# Patient Record
Sex: FEMALE | Race: BLACK OR AFRICAN AMERICAN | Employment: FULL TIME | URBAN - METROPOLITAN AREA
[De-identification: names, ages, dates, MRNs, and addresses within clinical notes are randomized per-mention and may not be internally consistent; named-entity substitution may affect disease eponyms.]

---

## 2017-09-29 ENCOUNTER — HOSPITAL ENCOUNTER (EMERGENCY)
Age: 37
Discharge: HOME | End: 2017-09-29
Attending: FAMILY MEDICINE

## 2017-09-29 VITALS
WEIGHT: 176 LBS | OXYGEN SATURATION: 99 % | BODY MASS INDEX: 28.28 KG/M2 | HEART RATE: 77 BPM | TEMPERATURE: 98.9 F | SYSTOLIC BLOOD PRESSURE: 131 MMHG | RESPIRATION RATE: 18 BRPM | HEIGHT: 66 IN | DIASTOLIC BLOOD PRESSURE: 82 MMHG

## 2017-09-29 DIAGNOSIS — R07.89 CHEST WALL PAIN: Primary | ICD-10-CM

## 2017-09-29 RX ORDER — CYCLOBENZAPRINE HCL 5 MG
5 TABLET ORAL
Qty: 20 TAB | Refills: 0 | Status: SHIPPED | OUTPATIENT
Start: 2017-09-29 | End: 2017-12-01 | Stop reason: ALTCHOICE

## 2017-09-29 RX ORDER — NAPROXEN 500 MG/1
500 TABLET ORAL
Qty: 30 TAB | Refills: 0 | Status: SHIPPED | OUTPATIENT
Start: 2017-09-29 | End: 2017-12-01 | Stop reason: ALTCHOICE

## 2017-09-29 NOTE — DISCHARGE INSTRUCTIONS
Musculoskeletal Chest Pain: Care Instructions  Your Care Instructions  Chest pain is not always a sign that something is wrong with your heart or that you have another serious problem. The doctor thinks your chest pain is caused by strained muscles or ligaments, inflamed chest cartilage, or another problem in your chest, rather than by your heart. You may need more tests to find the cause of your chest pain. Follow-up care is a key part of your treatment and safety. Be sure to make and go to all appointments, and call your doctor if you are having problems. Its also a good idea to know your test results and keep a list of the medicines you take. How can you care for yourself at home? · Take pain medicines exactly as directed. ¨ If the doctor gave you a prescription medicine for pain, take it as prescribed. ¨ If you are not taking a prescription pain medicine, ask your doctor if you can take an over-the-counter medicine. · Rest and protect the sore area. · Stop, change, or take a break from any activity that may be causing your pain or soreness. · Put ice or a cold pack on the sore area for 10 to 20 minutes at a time. Try to do this every 1 to 2 hours for the next 3 days (when you are awake) or until the swelling goes down. Put a thin cloth between the ice and your skin. · After 2 or 3 days, apply a heating pad set on low or a warm cloth to the area that hurts. Some doctors suggest that you go back and forth between hot and cold. · Do not wrap or tape your ribs for support. This may cause you to take smaller breaths, which could increase your risk of lung problems. · Mentholated creams such as Bengay or Icy Hot may soothe sore muscles. Follow the instructions on the package. · Follow your doctor's instructions for exercising. · Gentle stretching and massage may help you get better faster. Stretch slowly to the point just before pain begins, and hold the stretch for at least 15 to 30 seconds.  Do this 3 or 4 times a day. Stretch just after you have applied heat. · As your pain gets better, slowly return to your normal activities. Any increased pain may be a sign that you need to rest a while longer. When should you call for help? Call 911 anytime you think you may need emergency care. For example, call if:  · You have chest pain or pressure. This may occur with:  ¨ Sweating. ¨ Shortness of breath. ¨ Nausea or vomiting. ¨ Pain that spreads from the chest to the neck, jaw, or one or both shoulders or arms. ¨ Dizziness or lightheadedness. ¨ A fast or uneven pulse. After calling 911, chew 1 adult-strength aspirin. Wait for an ambulance. Do not try to drive yourself. · You have sudden chest pain and shortness of breath, or you cough up blood. Call your doctor now or seek immediate medical care if:  · You have any trouble breathing. · Your chest pain gets worse. · Your chest pain occurs consistently with exercise and is relieved by rest.  Watch closely for changes in your health, and be sure to contact your doctor if:  · Your chest pain does not get better after 1 week. Where can you learn more? Go to http://karley-hakan.info/. Enter V293 in the search box to learn more about \"Musculoskeletal Chest Pain: Care Instructions. \"  Current as of: March 20, 2017  Content Version: 11.3  © 7570-1932 [employer]. Care instructions adapted under license by [employer] (which disclaims liability or warranty for this information). If you have questions about a medical condition or this instruction, always ask your healthcare professional. [place] [address] any warranty or liability for your use of this information.

## 2017-09-29 NOTE — UC PROVIDER NOTE
HPI Comments: Jaci Zazueta presents with left sided chest wall pain with movement that started this AM, has been constant. No improvement with Advil. Denies SOB, palpitations, dizziness, N/V. The history is provided by the patient. History reviewed. No pertinent past medical history. Past Surgical History:   Procedure Laterality Date    HX GYN       c sections         Family History   Problem Relation Age of Onset    Heart Disease Neg Hx         Social History     Social History    Marital status:      Spouse name: N/A    Number of children: N/A    Years of education: N/A     Occupational History    Not on file. Social History Main Topics    Smoking status: Never Smoker    Smokeless tobacco: Never Used    Alcohol use No    Drug use: No    Sexual activity: Yes     Partners: Male     Other Topics Concern    Not on file     Social History Narrative                ALLERGIES: Review of patient's allergies indicates no known allergies. Review of Systems   Constitutional: Negative for chills and fever. Respiratory: Negative for shortness of breath and wheezing. Cardiovascular: Positive for chest pain. Negative for palpitations. Gastrointestinal: Negative for nausea and vomiting. Skin: Negative for rash. Neurological: Negative for dizziness and headaches. Vitals:    09/29/17 1443   BP: 131/82   Pulse: 77   Resp: 18   Temp: 98.9 °F (37.2 °C)   SpO2: 99%   Weight: 79.8 kg (176 lb)   Height: 5' 6\" (1.676 m)       Physical Exam   Constitutional: She appears well-developed and well-nourished. No distress. Cardiovascular: Normal rate, regular rhythm and normal heart sounds. Pulmonary/Chest: Effort normal and breath sounds normal. No respiratory distress. She has no wheezes. She has no rales. She exhibits no tenderness. Neurological: She is alert. Skin: She is not diaphoretic. Psychiatric: She has a normal mood and affect.  Her behavior is normal. Judgment and thought content normal.   Nursing note and vitals reviewed. MDM     Differential Diagnosis; Clinical Impression; Plan:     CLINICAL IMPRESSION:  Chest wall pain  (primary encounter diagnosis)    Plan:  1. Naproxen, flexeril prn  2. heat  3. ER if worse or experiencing SOB, dizziness  Risk of Significant Complications, Morbidity, and/or Mortality:   Presenting problems: Moderate  Diagnostic procedures: Moderate  Management options:   Moderate  Progress:   Patient progress:  Stable      EKG  Date/Time: 9/29/2017 2:50 PM  Performed by: Meera Caceres  Authorized by: Abimbola ROSADO     Interpretation:     Interpretation: normal    Rate:     ECG rate:  71    ECG rate assessment: normal    Rhythm:     Rhythm: sinus rhythm    Ectopy:     Ectopy: none    QRS:     QRS axis:  Normal    QRS intervals:  Normal  Conduction:     Conduction: normal    ST segments:     ST segments:  Normal  T waves:     T waves: normal

## 2017-09-30 LAB
ATRIAL RATE: 71 BPM
CALCULATED P AXIS, ECG09: 57 DEGREES
CALCULATED R AXIS, ECG10: 31 DEGREES
CALCULATED T AXIS, ECG11: 50 DEGREES
DIAGNOSIS, 93000: NORMAL
P-R INTERVAL, ECG05: 182 MS
Q-T INTERVAL, ECG07: 372 MS
QRS DURATION, ECG06: 86 MS
QTC CALCULATION (BEZET), ECG08: 404 MS
VENTRICULAR RATE, ECG03: 71 BPM

## 2017-12-01 ENCOUNTER — OFFICE VISIT (OUTPATIENT)
Dept: INTERNAL MEDICINE CLINIC | Age: 37
End: 2017-12-01

## 2017-12-01 VITALS
TEMPERATURE: 98.7 F | HEART RATE: 76 BPM | WEIGHT: 176.4 LBS | OXYGEN SATURATION: 93 % | DIASTOLIC BLOOD PRESSURE: 72 MMHG | RESPIRATION RATE: 16 BRPM | SYSTOLIC BLOOD PRESSURE: 120 MMHG | HEIGHT: 66 IN | BODY MASS INDEX: 28.35 KG/M2

## 2017-12-01 DIAGNOSIS — Z76.89 ENCOUNTER TO ESTABLISH CARE: ICD-10-CM

## 2017-12-01 DIAGNOSIS — B35.0 TINEA CAPITIS: Primary | ICD-10-CM

## 2017-12-01 DIAGNOSIS — L65.9 HAIR LOSS: ICD-10-CM

## 2017-12-01 RX ORDER — TERBINAFINE HYDROCHLORIDE 250 MG/1
250 TABLET ORAL DAILY
Qty: 45 TAB | Refills: 0 | Status: SHIPPED | OUTPATIENT
Start: 2017-12-01 | End: 2021-01-14 | Stop reason: ALTCHOICE

## 2017-12-01 NOTE — PROGRESS NOTES
Chief Complaint   Patient presents with    New Patient     want to establish pcp she is having some hair loss and daughter was diagnosed with tinea capitis  unsure why she is having problem

## 2017-12-05 LAB
ALBUMIN SERPL-MCNC: 4.2 G/DL (ref 3.5–5.5)
ALBUMIN/GLOB SERPL: 1.7 {RATIO} (ref 1.2–2.2)
ALP SERPL-CCNC: 61 IU/L (ref 39–117)
ALT SERPL-CCNC: 11 IU/L (ref 0–32)
AST SERPL-CCNC: 12 IU/L (ref 0–40)
BASOPHILS # BLD AUTO: 0 X10E3/UL (ref 0–0.2)
BASOPHILS NFR BLD AUTO: 1 %
BILIRUB SERPL-MCNC: 0.3 MG/DL (ref 0–1.2)
BUN SERPL-MCNC: 9 MG/DL (ref 6–20)
BUN/CREAT SERPL: 16 (ref 9–23)
CALCIUM SERPL-MCNC: 9.1 MG/DL (ref 8.7–10.2)
CHLORIDE SERPL-SCNC: 103 MMOL/L (ref 96–106)
CO2 SERPL-SCNC: 25 MMOL/L (ref 18–29)
CREAT SERPL-MCNC: 0.56 MG/DL (ref 0.57–1)
EOSINOPHIL # BLD AUTO: 0 X10E3/UL (ref 0–0.4)
EOSINOPHIL NFR BLD AUTO: 1 %
ERYTHROCYTE [DISTWIDTH] IN BLOOD BY AUTOMATED COUNT: 13.4 % (ref 12.3–15.4)
FOLATE SERPL-MCNC: 14.6 NG/ML
GFR SERPLBLD CREATININE-BSD FMLA CKD-EPI: 119 ML/MIN/1.73
GFR SERPLBLD CREATININE-BSD FMLA CKD-EPI: 138 ML/MIN/1.73
GLOBULIN SER CALC-MCNC: 2.5 G/DL (ref 1.5–4.5)
GLUCOSE SERPL-MCNC: 100 MG/DL (ref 65–99)
HCT VFR BLD AUTO: 36.2 % (ref 34–46.6)
HGB BLD-MCNC: 12.1 G/DL (ref 11.1–15.9)
IMM GRANULOCYTES # BLD: 0 X10E3/UL (ref 0–0.1)
IMM GRANULOCYTES NFR BLD: 0 %
LYMPHOCYTES # BLD AUTO: 1.7 X10E3/UL (ref 0.7–3.1)
LYMPHOCYTES NFR BLD AUTO: 46 %
MCH RBC QN AUTO: 30.1 PG (ref 26.6–33)
MCHC RBC AUTO-ENTMCNC: 33.4 G/DL (ref 31.5–35.7)
MCV RBC AUTO: 90 FL (ref 79–97)
MONOCYTES # BLD AUTO: 0.2 X10E3/UL (ref 0.1–0.9)
MONOCYTES NFR BLD AUTO: 7 %
NEUTROPHILS # BLD AUTO: 1.6 X10E3/UL (ref 1.4–7)
NEUTROPHILS NFR BLD AUTO: 45 %
PLATELET # BLD AUTO: 193 X10E3/UL (ref 150–379)
POTASSIUM SERPL-SCNC: 4.1 MMOL/L (ref 3.5–5.2)
PROT SERPL-MCNC: 6.7 G/DL (ref 6–8.5)
RBC # BLD AUTO: 4.02 X10E6/UL (ref 3.77–5.28)
SODIUM SERPL-SCNC: 140 MMOL/L (ref 134–144)
VIT B12 SERPL-MCNC: 423 PG/ML (ref 232–1245)
WBC # BLD AUTO: 3.6 X10E3/UL (ref 3.4–10.8)

## 2017-12-09 NOTE — PROGRESS NOTES
Results reviewed. CMP wnl with exception of glucose 100 and creatinine 0.56. CBC with diff and B12 /folate within range. Recommend eating healthy, decreasing concentrated sweets and stay active. Notified by [address] [place] [address] [address].

## 2017-12-21 NOTE — PROGRESS NOTES
This note will not be viewable in [address] Luis Fernando Owens is a 40y.o. year old female who is a new patient to me today. She presents to establish care and concern about hair loss. Luis Fernando Owens is a  40 y.o. female presents for visit. Chief Complaint   Patient presents with    New Patient     want to establish pcp she is having some hair loss and daughter was diagnosed with tinea capitis  unsure why she is having problem       HPI  Patient reports her daughter was diagnosed with tinea capitus and states she is concerned that she may have this too. Patient was previously evaluated by a dermatologist for this condition but KOH testing was not preformed. States she had some labs done there and were normal per patient. Record not available for review. Denies changing shampoo, soap. Generally healthy, no additional concerns. Review of Systems   Constitutional: Negative for chills, fever, malaise/fatigue and weight loss. Respiratory: Negative for cough and shortness of breath. Cardiovascular: Negative for chest pain and palpitations. Gastrointestinal: Negative for abdominal pain, constipation, diarrhea, heartburn, nausea and vomiting. Musculoskeletal: Negative for joint pain and myalgias. Neurological: Negative for headaches. Endo/Heme/Allergies: Negative for environmental allergies. Psychiatric/Behavioral: Negative for depression. The patient does not have insomnia. History reviewed. No pertinent past medical history. Past Surgical History:   Procedure Laterality Date    HX GYN       c sections        Social History   Substance Use Topics    Smoking status: Never Smoker    Smokeless tobacco: Never Used    Alcohol use Yes      Social History     Social History Narrative     Family History   Problem Relation Age of Onset    Thyroid Disease Maternal Grandmother     Heart Disease Neg Hx       Prior to Admission medications    Medication Sig Start Date End Date Taking? Authorizing Provider   selenium sulfide (SELSUN BLUE) 1 % shampoo Apply 10 mL to affected area as needed for Dandruff. 12/1/17  Yes Archie Eye, NP   terbinafine HCl (LAMISIL) 250 mg tablet Take 1 Tab by mouth daily. Indications: TINEA CAPITIS 12/1/17  Yes Archie Eye, NP   levonorgestrel (MIRENA) 20 mcg/24 hr IUD 1 Each by IntraUTERine route once. Yes Historical Provider      No Known Allergies       Visit Vitals    /72 (BP 1 Location: Left arm, BP Patient Position: Sitting)    Pulse 76    Temp 98.7 °F (37.1 °C) (Oral)    Resp 16    Ht 5' 6\" (1.676 m)    Wt 176 lb 6.4 oz (80 kg)    LMP 11/08/2017  Comment: iud    SpO2 93%    BMI 28.47 kg/m2     Physical Exam   Constitutional: She is oriented to person, place, and time. No distress. HENT:   Head: Normocephalic and atraumatic. Right Ear: External ear normal.   Left Ear: External ear normal.   Eyes: Conjunctivae are normal.   Cardiovascular: Normal rate, regular rhythm and normal heart sounds. Pulmonary/Chest: Effort normal and breath sounds normal. She has no wheezes. Musculoskeletal: She exhibits no edema. Neurological: She is alert and oriented to person, place, and time. Skin: Skin is warm and dry. No abrasion noted. Rash is not papular and not pustular. No cyanosis or erythema. Nails show no clubbing. Scaly patches with minimal subtle alopecia noted to scalp   Psychiatric: She has a normal mood and affect. Nursing note and vitals reviewed. No results found for this or any previous visit (from the past 24 hour(s)). ASSESSMENT AND PLAN:  There are no active problems to display for this patient. ICD-10-CM ICD-9-CM   1. Tinea capitis B35.0 110.0   2. Hair loss L65.9 704.00   3. BMI 28.0-28.9,adult Z68.28 V85.24   4.  Encounter to establish care Z76.89 V65.8     Orders Placed This Encounter    METABOLIC PANEL, COMPREHENSIVE    CBC WITH AUTOMATED DIFF    VITAMIN B12 & FOLATE    selenium sulfide (SELSUN BLUE) 1 % shampoo     Sig: Apply 10 mL to affected area as needed for Dandruff. Dispense:  1 Bottle     Refill:  0    terbinafine HCl (LAMISIL) 250 mg tablet     Sig: Take 1 Tab by mouth daily. Indications: TINEA CAPITIS     Dispense:  45 Tab     Refill:  0       Diagnoses and all orders for this visit:    1. Tinea capitis  -     selenium sulfide (SELSUN BLUE) 1 % shampoo; Apply 10 mL to affected area as needed for Dandruff. -     terbinafine HCl (LAMISIL) 250 mg tablet; Take 1 Tab by mouth daily. Indications: TINEA CAPITIS  -     METABOLIC PANEL, COMPREHENSIVE  -     CBC WITH AUTOMATED DIFF    2. Hair loss  -     METABOLIC PANEL, COMPREHENSIVE  -     CBC WITH AUTOMATED DIFF  -     VITAMIN B12 & FOLATE    3. BMI 28.0-28.9,adult    4. Encounter to establish care        lab results and schedule of future lab studies reviewed with patient  reviewed diet, exercise and weight control  continue current treatment pending lab results        Follow-up Disposition:  Return in about 4 weeks (around 12/29/2017), or if symptoms worsen or fail to improve, for FULL Phyisal Exam and f/u. Disclaimer:  Advised her to call back or return to office if symptoms worsen/change/persist.  Discussed expected course/resolution/complications of diagnosis in detail with patient. Medication risks/benefits/costs/interactions/alternatives discussed with patient. She was given an after visit summary which includes diagnoses, current medications, & vitals. She expressed understanding with the diagnosis and plan.

## 2018-02-14 ENCOUNTER — HOSPITAL ENCOUNTER (OUTPATIENT)
Dept: LAB | Age: 38
Discharge: HOME | End: 2018-02-14

## 2021-01-14 ENCOUNTER — OFFICE VISIT (OUTPATIENT)
Dept: FAMILY MEDICINE CLINIC | Age: 41
End: 2021-01-14
Payer: COMMERCIAL

## 2021-01-14 VITALS
OXYGEN SATURATION: 98 % | WEIGHT: 192.8 LBS | SYSTOLIC BLOOD PRESSURE: 119 MMHG | TEMPERATURE: 97.8 F | HEIGHT: 66 IN | BODY MASS INDEX: 30.98 KG/M2 | HEART RATE: 77 BPM | RESPIRATION RATE: 18 BRPM | DIASTOLIC BLOOD PRESSURE: 80 MMHG

## 2021-01-14 DIAGNOSIS — Z13.220 SCREENING FOR LIPID DISORDERS: ICD-10-CM

## 2021-01-14 DIAGNOSIS — Z00.00 ROUTINE GENERAL MEDICAL EXAMINATION AT HEALTH CARE FACILITY: Primary | ICD-10-CM

## 2021-01-14 DIAGNOSIS — Z13.1 SCREENING FOR DIABETES MELLITUS: ICD-10-CM

## 2021-01-14 DIAGNOSIS — E55.9 VITAMIN D DEFICIENCY: ICD-10-CM

## 2021-01-14 PROCEDURE — 99386 PREV VISIT NEW AGE 40-64: CPT | Performed by: FAMILY MEDICINE

## 2021-01-14 NOTE — PROGRESS NOTES
Chief Complaint   Patient presents with   [facility].     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 Have you been to the ER, urgent care clinic since your last visit? Hospitalized since your last visit? No    2. Have you seen or consulted any other health care providers outside of the [address] [place] [address] since your last visit? Include any pap smears or colon screening. No    3 most recent PHQ Screens 1/14/2021   Little interest or pleasure in doing things Not at all   Feeling down, depressed, irritable, or hopeless Not at all   Total Score PHQ 2 0       Health Maintenance Due   Topic Date Due    PAP AKA CERVICAL CYTOLOGY  10/03/2001    Flu Vaccine (1) 09/01/2020    Lipid Screen  10/03/2020     Pt. Had a pap smear and a Mammogram on November 2020    Patient declined flu vaccine today but will schedule an appointment later.

## 2021-01-14 NOTE — PROGRESS NOTES
Patient Name: Monie Valentin   MRN: [id number]    Anita Roa is a 36 y.o. female who presents with the following: Here to establish care with new PCP. Cervical Cancer Screening: UTD, done by OBGYN, records requested. Breast Cancer Screening: UTD - done by OBGYN, records requested    CAD risk factors:  HTN: wnl  BP Readings from Last 3 Encounters:   01/14/21 119/80   12/01/17 120/72   09/29/17 131/82     Lipid: due  DM: due    Hx of vitamin D deficiency, not on supplements. Review of Systems   Constitutional: Negative for chills, fever, malaise/fatigue and weight loss. HENT: Negative for hearing loss, nosebleeds and sore throat. Respiratory: Negative for cough, sputum production, shortness of breath and wheezing. Cardiovascular: Negative for chest pain, palpitations, leg swelling and PND. Gastrointestinal: Negative for abdominal pain, blood in stool, constipation, diarrhea, nausea and vomiting. Genitourinary: Negative for dysuria, frequency and urgency. Musculoskeletal: Negative for back pain, falls, joint pain, myalgias and neck pain. Skin: Negative for itching and rash. Neurological: Negative for dizziness, sensory change, focal weakness and loss of consciousness. Psychiatric/Behavioral: Negative for depression. The patient is not nervous/anxious. All other systems reviewed and are negative. The patient's medications, allergies, past medical history, surgical history, family history and social history were reviewed and updated where appropriate. Prior to Admission medications    Medication Sig Start Date End Date Taking? Authorizing Provider   selenium sulfide (SELSUN BLUE) 1 % shampoo Apply 10 mL to affected area as needed for Dandruff. 12/1/17   Sommer Miller, NP   terbinafine HCl (LAMISIL) 250 mg tablet Take 1 Tab by mouth daily.  Indications: TINEA CAPITIS 12/1/17   Sommer Miller NP   levonorgestrel (MIRENA) 20 mcg/24 hr IUD 1 Each by IntraUTERine route once.       Provider, Historical       No Known Allergies      Past Medical History:   Diagnosis Date    Arthritis     Vitamin D deficiency 1/14/2021       Past Surgical History:   Procedure Laterality Date    HX GYN       c sections    HX JUAN AND BSO         Family History   Problem Relation Age of Onset    Thyroid Disease Maternal Grandmother     Prostate Cancer Maternal Grandfather     Breast Cancer Maternal Aunt     Heart Disease Neg Hx        Social History     Socioeconomic History    Marital status:      Spouse name: Not on file    Number of children: Not on file    Years of education: Not on file    Highest education level: Not on file   Occupational History    Not on file   Social Needs    Financial resource strain: Not on file    Food insecurity     Worry: Not on file     Inability: Not on file    Transportation needs     Medical: Not on file     Non-medical: Not on file   Tobacco Use    Smoking status: Never Smoker    Smokeless tobacco: Never Used   Substance and Sexual Activity    Alcohol use: Yes     Frequency: 2-3 times a week     Drinks per session: 1 or 2    Drug use: No    Sexual activity: Yes     Partners: Male   Lifestyle    Physical activity     Days per week: Not on file     Minutes per session: Not on file    Stress: Not on file   Relationships    Social connections     Talks on phone: Not on file     Gets together: Not on file     Attends Confucianism service: Not on file     Active member of club or organization: Not on file     Attends meetings of clubs or organizations: Not on file     Relationship status: Not on file    Intimate partner violence     Fear of current or ex partner: Not on file     Emotionally abused: Not on file     Physically abused: Not on file     Forced sexual activity: Not on file   Other Topics Concern    Not on file   Social History Narrative    Not on file         OBJECTIVE    Visit Vitals  /80 (BP 1 Location: Left arm, BP Patient Position: Sitting)   Pulse 77   Temp 97.8 °F (36.6 °C)   Resp 18   Ht 5' 6\" (1.676 m)   Wt 192 lb 12.8 oz (87.5 kg)   LMP 11/08/2017 Comment: iud   SpO2 98%   BMI 31.12 kg/m²       Physical Exam  Constitutional:       General: She is not in acute distress. Appearance: She is not diaphoretic. HENT:      Head: Normocephalic. Right Ear: External ear normal.      Left Ear: External ear normal.   Eyes:      Conjunctiva/sclera: Conjunctivae normal.      Pupils: Pupils are equal, round, and reactive to light. Cardiovascular:      Rate and Rhythm: Normal rate and regular rhythm. Heart sounds: Normal heart sounds. No murmur. No friction rub. No gallop. Pulmonary:      Effort: Pulmonary effort is normal. No respiratory distress. Breath sounds: Normal breath sounds. No wheezing or rales. Abdominal:      General: Bowel sounds are normal. There is no distension. Palpations: Abdomen is soft. Tenderness: There is no abdominal tenderness. There is no guarding or rebound. Skin:     General: Skin is warm and dry. Neurological:      Mental Status: She is alert and oriented to person, place, and time. Psychiatric:         Mood and Affect: Affect normal.         Cognition and Memory: Memory normal.         Judgment: Judgment normal.           ASSESSMENT AND PLAN  Miriam Marroquin is a 36 y.o. female who presents today for:    1. Routine general medical examination at health care facility  Reviewed age appropriate screening tests as recommended by the [place] Preventive Services Database with patient today. 2. Screening for diabetes mellitus  - HEMOGLOBIN A1C WITH EAG; Future  - HEMOGLOBIN A1C WITH EAG    3. Screening for lipid disorders  - CBC W/O DIFF; Future  - METABOLIC PANEL, COMPREHENSIVE; Future  - LIPID PANEL; Future  - LIPID PANEL  - METABOLIC PANEL, COMPREHENSIVE  - CBC W/O DIFF    4.  Vitamin D deficiency  - VITAMIN D, 25 HYDROXY; Future  - VITAMIN D, 25 HYDROXY       Medications Discontinued During This Encounter   Medication Reason    levonorgestrel (MIRENA) 20 mcg/24 hr IUD Therapy Completed    selenium sulfide (SELSUN BLUE) 1 % shampoo Therapy Completed    terbinafine HCl (LAMISIL) 250 mg tablet Therapy Completed           Treatment risks/benefits/costs/interactions/alternatives discussed with patient. Advised patient to call back or return to office if symptoms worsen/change/persist. If patient cannot reach us or should anything more severe/urgent arise he/she should proceed directly to the nearest emergency department. Discussed expected course/resolution/complications of diagnosis in detail with patient. Patient expressed understanding with the diagnosis and plan. Joanna Schulz M.D.

## 2021-01-15 LAB
25(OH)D3 SERPL-MCNC: 19.6 NG/ML (ref 30–100)
ALBUMIN SERPL-MCNC: 4.2 G/DL (ref 3.5–5)
ALBUMIN/GLOB SERPL: 1.3 {RATIO} (ref 1.1–2.2)
ALP SERPL-CCNC: 68 U/L (ref 45–117)
ALT SERPL-CCNC: 22 U/L (ref 12–78)
ANION GAP SERPL CALC-SCNC: 6 MMOL/L (ref 5–15)
AST SERPL-CCNC: 14 U/L (ref 15–37)
BILIRUB SERPL-MCNC: 0.2 MG/DL (ref 0.2–1)
BUN SERPL-MCNC: 15 MG/DL (ref 6–20)
BUN/CREAT SERPL: 20 (ref 12–20)
CALCIUM SERPL-MCNC: 9.5 MG/DL (ref 8.5–10.1)
CHLORIDE SERPL-SCNC: 105 MMOL/L (ref 97–108)
CHOLEST SERPL-MCNC: 167 MG/DL
CO2 SERPL-SCNC: 27 MMOL/L (ref 21–32)
CREAT SERPL-MCNC: 0.74 MG/DL (ref 0.55–1.02)
ERYTHROCYTE [DISTWIDTH] IN BLOOD BY AUTOMATED COUNT: 11.9 % (ref 11.5–14.5)
EST. AVERAGE GLUCOSE BLD GHB EST-MCNC: 97 MG/DL
GLOBULIN SER CALC-MCNC: 3.3 G/DL (ref 2–4)
GLUCOSE SERPL-MCNC: 86 MG/DL (ref 65–100)
HBA1C MFR BLD: 5 % (ref 4–5.6)
HCT VFR BLD AUTO: 39.3 % (ref 35–47)
HDLC SERPL-MCNC: 70 MG/DL
HDLC SERPL: 2.4 {RATIO} (ref 0–5)
HGB BLD-MCNC: 12.8 G/DL (ref 11.5–16)
LDLC SERPL CALC-MCNC: 80.6 MG/DL (ref 0–100)
LIPID PROFILE,FLP: NORMAL
MCH RBC QN AUTO: 30.7 PG (ref 26–34)
MCHC RBC AUTO-ENTMCNC: 32.6 G/DL (ref 30–36.5)
MCV RBC AUTO: 94.2 FL (ref 80–99)
NRBC # BLD: 0 K/UL (ref 0–0.01)
NRBC BLD-RTO: 0 PER 100 WBC
PLATELET # BLD AUTO: 202 K/UL (ref 150–400)
PMV BLD AUTO: 11.5 FL (ref 8.9–12.9)
POTASSIUM SERPL-SCNC: 3.9 MMOL/L (ref 3.5–5.1)
PROT SERPL-MCNC: 7.5 G/DL (ref 6.4–8.2)
RBC # BLD AUTO: 4.17 M/UL (ref 3.8–5.2)
SODIUM SERPL-SCNC: 138 MMOL/L (ref 136–145)
TRIGL SERPL-MCNC: 82 MG/DL (ref ?–150)
VLDLC SERPL CALC-MCNC: 16.4 MG/DL
WBC # BLD AUTO: 4.1 K/UL (ref 3.6–11)

## 2021-01-15 RX ORDER — ASPIRIN 325 MG
TABLET, DELAYED RELEASE (ENTERIC COATED) ORAL
Qty: 8 CAP | Refills: 0 | Status: SHIPPED | OUTPATIENT
Start: 2021-01-15 | End: 2021-12-30

## 2021-01-15 NOTE — PROGRESS NOTES
Dear Ms. Laboy,    I wanted to follow up on your recent test results:    Vitamin D levels are low. I have sent in a prescription for high dose vitamin D3 at 50,000 units once a week for 8 weeks. After 8 weeks, switch to a lower dose of over-the-counter vitamin D3 2000 units every day.   Other labs are normal.

## 2021-08-20 ENCOUNTER — TRANSCRIBE ORDER (OUTPATIENT)
Dept: SCHEDULING | Age: 41
End: 2021-08-20

## 2021-08-20 DIAGNOSIS — Z80.0 FAMILY HISTORY OF COLON CANCER: Primary | ICD-10-CM

## 2021-08-20 DIAGNOSIS — R10.11 RUQ PAIN: ICD-10-CM

## 2022-01-24 ENCOUNTER — OFFICE VISIT (OUTPATIENT)
Dept: SURGERY | Age: 42
End: 2022-01-24
Payer: COMMERCIAL

## 2022-01-24 VITALS
SYSTOLIC BLOOD PRESSURE: 116 MMHG | DIASTOLIC BLOOD PRESSURE: 82 MMHG | BODY MASS INDEX: 29.73 KG/M2 | HEIGHT: 66 IN | HEART RATE: 71 BPM | WEIGHT: 185 LBS

## 2022-01-24 DIAGNOSIS — N63.10 BREAST MASS, RIGHT: Primary | ICD-10-CM

## 2022-01-24 PROCEDURE — 76642 ULTRASOUND BREAST LIMITED: CPT | Performed by: SURGERY

## 2022-01-24 PROCEDURE — 99203 OFFICE O/P NEW LOW 30 MIN: CPT | Performed by: SURGERY

## 2022-01-24 NOTE — PROGRESS NOTES
HISTORY OF PRESENT ILLNESS  Sal Paul is a 39 y.o. female. HPI  NEW patient consult referred by  Dr. Nikki Elizalde for RIGHT breast fibroepithelial lesion vs phyllodes tumor. Patient is able to feel a RIGHT breast lump. Painful only when pressed. Mammogram and biopsy done. LTR = 19.7%    1/4/22 - RIGHT breast biopsy - fibroepithelial lesion vs phyllodes tumor    Family History:  Maternal aunt, breast cancer, dx in her late 42's, survivor. Maternal grandmother, colon cancer, genetic testing negative  Maternal grandfather, prostate cancer. Sister has fibroadenomas. Mammogram/us. [id number] James Salomon, 12/1/21, BIRADS 4  1.4x1. 6x1.0cm oval solid mass to RIGHT breast @6:0 8 cmfn    Past Medical History:   Diagnosis Date    Arthritis     Vitamin D deficiency 1/14/2021     Past Surgical History:   Procedure Laterality Date    HX GYN       c sections    HX PELVIC LAPAROSCOPY      d/t endometriosis    HX JUAN AND BSO       Social History     Socioeconomic History    Marital status:      Spouse name: Not on file    Number of children: Not on file    Years of education: Not on file    Highest education level: Not on file   Occupational History    Not on file   Tobacco Use    Smoking status: Never Smoker    Smokeless tobacco: Never Used   Vaping Use    Vaping Use: Never used   Substance and Sexual Activity    Alcohol use: Yes     Comment: 3-5 a week    Drug use: No    Sexual activity: Yes     Partners: Male   Other Topics Concern    Not on file   Social History Narrative    Not on file     Social Determinants of Health     Financial Resource Strain:     Difficulty of Paying Living Expenses: Not on file   Food Insecurity:     Worried About Running Out of Food in the Last Year: Not on file    Helen of Food in the Last Year: Not on file   Transportation Needs:     Lack of Transportation (Medical): Not on file    Lack of Transportation (Non-Medical):  Not on file   Physical Activity:     Days of Exercise per Week: Not on file    Minutes of Exercise per Session: Not on file   Stress:     Feeling of Stress : Not on file   Social Connections:     Frequency of Communication with Friends and Family: Not on file    Frequency of Social Gatherings with Friends and Family: Not on file    Attends Yazidism Services: Not on file    Active Member of [address] [place] [address] Good People or Organizations: Not on file    Attends Club or Organization Meetings: Not on file    Marital Status: Not on file   Intimate Partner Violence:     Fear of Current or Ex-Partner: Not on file    Emotionally Abused: Not on file    Physically Abused: Not on file    Sexually Abused: Not on file   Housing Stability:     Unable to Pay for Housing in the Last Year: Not on file    Number of [place] in the Last Year: Not on file    Unstable Housing in the Last Year: Not on file     OB History        2    Para        Term                AB        Living           SAB        IAB        Ectopic        Molar        Multiple        Live Births              Obstetric Comments   Menarche 15, LMP 2017, # of children 2, age of 4st delivery 32, Hysterectomy/oophorectomy yes/yes, but still has one, Breast bx yes, history of breast feeding yes, BCP no, Hormone therapy no             Current Outpatient Medications:     cholecalciferol (VITAMIN D3) (5000 Units /125 mcg) capsule, Take 5,000 Units by mouth daily. , Disp: , Rfl:   Allergies   Allergen Reactions    Percocet [Oxycodone-Acetaminophen] Nausea and Vomiting and Other (comments)     Sweating/dizziness. Review of Systems   Musculoskeletal: Positive for joint pain and neck pain. All other systems reviewed and are negative. Physical Exam  Vitals and nursing note reviewed. Chest:   Breasts: Breasts are symmetrical.      Right: Mass present. No inverted nipple, nipple discharge, skin change, tenderness, axillary adenopathy or supraclavicular adenopathy.          Lymphadenopathy: Cervical: No cervical adenopathy. Upper Body:      Right upper body: No supraclavicular, axillary or pectoral adenopathy. BREAST ULTRASOUND, Preop planning  Indication:preop planning  right Breast 6:00 8 cfn   Technique: The area was scanned using a high-frequency linear-array near-field transducer  Findings:irregular mass with dropout  Impression: Biopsy site visible with ultrasound with clip   Disposition:  Will schedule right breast us guided excisional biopsy     ASSESSMENT and PLAN    ICD-10-CM ICD-9-CM    1. Breast mass, right  N63.10 611.72      38 yo female with right breast fibroepithelial lesion  Discussed this could be fibroadenoma or phyllodes. Recommend right breast excisional biopsy   Discussed procedure and risks. Risks include bleeding, bruising, scar, infection, need for more surgery   She understood and wished to proceed. Will schedule. 30 minutes was spent with patient on counseling and coordination of care.

## 2022-03-19 PROBLEM — E55.9 VITAMIN D DEFICIENCY: Status: ACTIVE | Noted: 2021-01-14

## 2022-03-22 ENCOUNTER — HOSPITAL ENCOUNTER (OUTPATIENT)
Dept: PREADMISSION TESTING | Age: 42
Discharge: HOME | End: 2022-03-22
Payer: COMMERCIAL

## 2022-03-22 VITALS
SYSTOLIC BLOOD PRESSURE: 122 MMHG | WEIGHT: 191.36 LBS | RESPIRATION RATE: 12 BRPM | DIASTOLIC BLOOD PRESSURE: 84 MMHG | HEIGHT: 66 IN | HEART RATE: 76 BPM | BODY MASS INDEX: 30.75 KG/M2 | TEMPERATURE: 97.8 F

## 2022-03-22 DIAGNOSIS — N63.10 BREAST MASS, RIGHT: Primary | ICD-10-CM

## 2022-03-22 LAB
BASOPHILS # BLD: 0 K/UL (ref 0–0.1)
BASOPHILS NFR BLD: 1 % (ref 0–1)
DIFFERENTIAL METHOD BLD: ABNORMAL
EOSINOPHIL # BLD: 0.2 K/UL (ref 0–0.4)
EOSINOPHIL NFR BLD: 5 % (ref 0–7)
ERYTHROCYTE [DISTWIDTH] IN BLOOD BY AUTOMATED COUNT: 12.5 % (ref 11.5–14.5)
HCT VFR BLD AUTO: 38.4 % (ref 35–47)
HGB BLD-MCNC: 12.5 G/DL (ref 11.5–16)
IMM GRANULOCYTES # BLD AUTO: 0 K/UL (ref 0–0.04)
IMM GRANULOCYTES NFR BLD AUTO: 0 % (ref 0–0.5)
LYMPHOCYTES # BLD: 1.2 K/UL (ref 0.8–3.5)
LYMPHOCYTES NFR BLD: 38 % (ref 12–49)
MCH RBC QN AUTO: 30.6 PG (ref 26–34)
MCHC RBC AUTO-ENTMCNC: 32.6 G/DL (ref 30–36.5)
MCV RBC AUTO: 93.9 FL (ref 80–99)
MONOCYTES # BLD: 0.3 K/UL (ref 0–1)
MONOCYTES NFR BLD: 10 % (ref 5–13)
NEUTS SEG # BLD: 1.4 K/UL (ref 1.8–8)
NEUTS SEG NFR BLD: 46 % (ref 32–75)
NRBC # BLD: 0 K/UL (ref 0–0.01)
NRBC BLD-RTO: 0 PER 100 WBC
PLATELET # BLD AUTO: 190 K/UL (ref 150–400)
PMV BLD AUTO: 11.3 FL (ref 8.9–12.9)
RBC # BLD AUTO: 4.09 M/UL (ref 3.8–5.2)
WBC # BLD AUTO: 3.1 K/UL (ref 3.6–11)

## 2022-03-22 PROCEDURE — 36415 COLL VENOUS BLD VENIPUNCTURE: CPT

## 2022-03-22 PROCEDURE — 85025 COMPLETE CBC W/AUTO DIFF WBC: CPT

## 2022-03-22 RX ORDER — FLUCONAZOLE 100 MG/1
100 TABLET ORAL AS NEEDED
COMMUNITY
End: 2022-04-12

## 2022-03-22 NOTE — PERIOP NOTES
Preoperative instructions reviewed with patient. Patient given SSI infection FAQS sheet. Given two bottles of skin prep chlorhexidine soap; given written and verbal instructions on use. Patient was given the opportunity to ask questions on the information provided. PATIENT HAD COPY OF COVID VACCINATION ON HER PHONE.  VIEWED AND DATES ENTERED INTO EMR.

## 2022-03-22 NOTE — PERIOP NOTES
[address] INSTRUCTIONS    Surgery Date:   3/29/22    [facility] staff will call you between 4 PM- 8 PM the day before surgery with your arrival time. If your surgery is on a Monday, we will call you the preceding Friday. Please call [phone] after 8 PM if you did not receive your arrival time. 1. Please report to [facility] Patient Access/Admitting on the 1st floor. Bring your insurance card, photo identification, and any copayment ( if applicable). 2. If you are going home the same day of your surgery, you must have a responsible adult to drive you home. You need to have a responsible adult to stay with you the first 24 hours after surgery and you should not drive a car for 24 hours following your surgery. 3. Do NOT eat any solid foods after midnight the night before surgery including candy, mint or gum. You may drink clear liquids from midnight until 1 hour prior to your arrival. You may drink up to 12 ounces at one time every 4 hours. Please note special instructions, if applicable, below for medications. 4. Do NOT drink alcohol or smoke 24 hours before surgery. STOP smoking for 14 days prior as it helps with breathing and healing after surgery. 5. If you are being admitted to the hospital, please leave personal belongings/luggage in your car until you have an assigned hospital room number. 6. Please wear comfortable clothes. Wear your glasses instead of contacts. We ask that all money, jewelry and valuables be left at home. Wear no make up, particularly mascara, the day of surgery. 7.  All body piercings, rings, and jewelry need to be removed and left at home. Please remove any nail polish or artifical nails from your fingernails. Please wear your hair loose or down. Please no pony-tails, buns, or any metal hair accessories. If you shower the morning of surgery, please do not apply any lotions or powders afterwards. You may wear deodorant, unless having breast surgery.   Do not shave any body area within 24 hours of your surgery. 8. Please follow all instructions to avoid any potential surgical cancellation. 9. Should your physical condition change, (i.e. fever, cold, flu, etc.) please notify your surgeon as soon as possible. 10. It is important to be on time. If a situation occurs where you may be delayed, please call:  [phone] / [phone] on the day of surgery. 11. The Preadmission Testing staff can be reached at [phone]. 12. Special instructions: NONE      Current Outpatient Medications   Medication Sig    fluconazole (DIFLUCAN) 100 mg tablet Take 100 mg by mouth as needed (ACNE). FDA advises cautious prescribing of oral fluconazole in pregnancy.  cholecalciferol (VITAMIN D3) (5000 Units /125 mcg) capsule Take 5,000 Units by mouth daily. No current facility-administered medications for this encounter. 1. YOU MUST ONLY TAKE THESE MEDICATIONS THE MORNING OF SURGERY WITH A SIP OF WATER: NONE  2. MEDICATIONS TO TAKE THE MORNING OF SURGERY ONLY IF NEEDED: NONE  3. HOLD these prescription medications BEFORE Surgery: NONE  4. Ask your surgeon/prescribing physician about when/if to STOP taking these medications: NONE  5. Stop all vitamins, herbal medicines and Aspirin containing products 7 days prior to surgery. Stop any non-steroidal anti-inflammatory drugs (i.e. Ibuprofen, Naproxen, Advil, Aleve) 3 days before surgery. You may take Tylenol. 6. If you are currently taking Plavix, Coumadin,or any other blood-thinning/anticoagulant medication contact your prescribing physician for instructions. Eating and Drinking Before Surgery     You may eat a regular dinner at the usual time on the day before your surgery.  Do NOT eat any solid foods after midnight unless your arrival time at the hospital is 3pm or later.  You may drink clear liquids only from 12 midnight until 1 hours prior to your arrival time at the hospital on the day of your surgery. Do NOT drink alcohol.  Clear liquids include:  o Water  o Fruit juices without pulp( i.e. apple juice)  o Carbonated beverages  o Black coffee (no cream/milk)  o Tea (no cream/milk)  o Gatorade   You may drink up to 12-16 ounces at one time every 4 hours between the hours of midnight and 1 hour before your arrival time at the hospital. Example- if your arrival time at the hospital is 6am, you may drink 12-16 ounces of clear liquids no later than 5am.   If your arrival time at the hospital is 3pm or later, you may eat a light breakfast before 8am.   A light breakfast includes:  o Toast or bagel (no butter)  o Black coffee (no cream/milk)  o Tea (no cream/milk)  o Fruit juices without pulp ( i.e. apple juice)  o Do NOT eat meat, eggs, vegetables or fruit   If you have any questions, please contact your surgeon's office. Preventing Infections Before and After - Your Surgery    IMPORTANT INSTRUCTIONS    You play an important role in your health and preparation for surgery. To reduce the germs on your skin you will need to shower with CHG soap (Chorhexidine gluconate 4%) two times before surgery. CHG soap (Hibiclens, Hex-A-Clens or store brand)   CHG soap will be provided at your Preadmission Testing (PAT) appointment.  If you do not have a PAT appointment before surgery, you may arrange to  CHG soap from our office or purchase CHG soap at a pharmacy, grocery or department store.  You need to purchase TWO 4 ounce bottles to use for your 2 showers. Steps to follow:  1. Wash your hair with your normal shampoo and your body with regular soap and rinse well to remove shampoo and soap from your skin. 2. Wet a clean washcloth and turn off the shower. 3. Put CHG soap on washcloth and apply to your entire body from the neck down. Do not use on your head, face or private parts(genitals). Do not use CHG soap on open sores, wounds or areas of skin irritation. 4. Wash you body gently for 5 minutes.  Do not wash your skin too hard. This soap does not create lather. Pay special attention to your underarms and from your belly button to your feet. 5. Turn the shower back on and rinse well to get CHG soap off your body. 6. Pat your skin dry with a clean, dry towel. Do not apply lotions or moisturizer. 7. Put on clean clothes and sleep on fresh bed sheets and do not allow pets to sleep with you. Shower with CHG soap 2 times before your surgery   The evening before your surgery   The morning of your surgery      Tips to help prevent infections after your surgery:  1. Protect your surgical wound from germs:  ? Hand washing is the most important thing you and your caregivers can do to prevent infections. ? Keep your bandage clean and dry! ? Do not touch your surgical wound. 2. Use clean, freshly washed towels and washcloths every time you shower; do not share bath linens with others. 3. Until your surgical wound is healed, wear clothing and sleep on bed linens each day that are clean and freshly washed. 4. Do not allow pets to sleep in your bed with you or touch your surgical wound. 5. Do not smoke - smoking delays wound healing. This may be a good time to stop smoking. 6. If you have diabetes, it is important for you to manage your blood sugar levels properly before your surgery as well as after your surgery. Poorly managed blood sugar levels slow down wound healing and prevent you from healing completely. [address] [place]   Instructions for Pre-Surgery COVID-19 Testing     Across our ministry we have established standard guidelines to ensure the health and safety of our patients, residents and associates as we resume elective services for patients. All patients presenting for surgery are required to have a COVID-19 test result within 96 hours of their scheduled surgery. [address] [place] [address] is providing this test free of charge to the patient.    Instructions for COVID-19 Testing:     Patients will receive a call from Mathieu Jacobson 4-5 days prior to surgery to schedule a date and time to come to the [address] [place] [place] [address] Drive for their COVID-19 test   Patients are advised to self-quarantine after testing until their scheduled surgery   Once on site, patients will be registered and receive COVID test in their vehicle   If a patient is scheduled for normal Pre Admission Testing 96 hours from date of surgery, the patient will still have their COVID test done at the [address] [place] [address] located at [address] [place] [address] Positive results will be shared with the surgeon and anesthesiologist and may result in cancellation of the elective procedure    Testing Hours and Location:   Address:  [address] [place] [address] Pre Admission 11 [facility] in the Discharge Lot on [facility] (Map Attached)  174 [facility], [address]   Hours: Monday- Friday 7a-3p    PAT Phone Number: [phone]              Patient Information Regarding COVID Restrictions    Patients are advised to self-quarantine after COVID testing up to the day of the scheduled procedure. Day of Procedure     Please park in the parking deck or any designated visitor parking lot.  Enter the facility through the Main Entrance of the hospital.   A temperature check and appropriate symptom/exposure screening will be done prior to entry to the facility.  On the day of surgery, please provide the cell phone number of the person who will be waiting for you to the Patient Access representative at the time of registration.  Please wear a mask on the day of your procedure.  We are now allowing one designated visitor per stay. Pediatric patients may have 2 designated visitors. This one person may come in with you on the day of your procedure.  No visitors under the age of 13.  The designated visitor must also wear a mask.    Once your procedure and the immediate recovery period is completed, a nurse in the recovery area will contact your designated visitor to inform them of your room number or to otherwise review other pertinent information regarding your care.  Social distancing practices are to be adhered to in waiting areas and the cafeteria. The patient was contacted  in person. She verbalized understanding of all instructions does not  need reinforcement.

## 2022-03-24 ENCOUNTER — HOSPITAL ENCOUNTER (OUTPATIENT)
Dept: PREADMISSION TESTING | Age: 42
Discharge: HOME | End: 2022-03-24
Attending: SURGERY
Payer: COMMERCIAL

## 2022-03-24 ENCOUNTER — TRANSCRIBE ORDER (OUTPATIENT)
Dept: REGISTRATION | Age: 42
End: 2022-03-24

## 2022-03-24 DIAGNOSIS — U07.1 COVID-19: Primary | ICD-10-CM

## 2022-03-24 DIAGNOSIS — U07.1 COVID-19: ICD-10-CM

## 2022-03-24 LAB
SARS-COV-2, XPLCVT: NOT DETECTED
SOURCE, COVRS: NORMAL

## 2022-03-24 PROCEDURE — U0005 INFEC AGEN DETEC AMPLI PROBE: HCPCS

## 2022-03-28 RX ORDER — FEXOFENADINE HCL AND PSEUDOEPHEDRINE HCI 60; 120 MG/1; MG/1
1 TABLET, EXTENDED RELEASE ORAL EVERY 12 HOURS
COMMUNITY
End: 2022-04-12

## 2022-03-29 ENCOUNTER — ANESTHESIA (OUTPATIENT)
Dept: MEDSURG UNIT | Age: 42
End: 2022-03-29
Payer: COMMERCIAL

## 2022-03-29 ENCOUNTER — ANESTHESIA EVENT (OUTPATIENT)
Dept: MEDSURG UNIT | Age: 42
End: 2022-03-29
Payer: COMMERCIAL

## 2022-03-29 ENCOUNTER — HOSPITAL ENCOUNTER (OUTPATIENT)
Age: 42
Setting detail: OUTPATIENT SURGERY
Discharge: HOME | End: 2022-03-29
Attending: SURGERY | Admitting: SURGERY
Payer: COMMERCIAL

## 2022-03-29 VITALS
TEMPERATURE: 98 F | SYSTOLIC BLOOD PRESSURE: 144 MMHG | HEART RATE: 92 BPM | DIASTOLIC BLOOD PRESSURE: 99 MMHG | OXYGEN SATURATION: 98 % | WEIGHT: 185 LBS | RESPIRATION RATE: 17 BRPM | BODY MASS INDEX: 29.86 KG/M2

## 2022-03-29 DIAGNOSIS — N63.10 BREAST MASS, RIGHT: ICD-10-CM

## 2022-03-29 PROCEDURE — 74011250637 HC RX REV CODE- 250/637: Performed by: ANESTHESIOLOGY

## 2022-03-29 PROCEDURE — 76060000061 HC AMB SURG ANES 0.5 TO 1 HR: Performed by: SURGERY

## 2022-03-29 PROCEDURE — 77030010509 HC AIRWY LMA MSK TELE -A: Performed by: ANESTHESIOLOGY

## 2022-03-29 PROCEDURE — 76030000000 HC AMB SURG OR TIME 0.5 TO 1: Performed by: SURGERY

## 2022-03-29 PROCEDURE — 77030011825 HC SUPP SURG PSTOP S2SG -B: Performed by: SURGERY

## 2022-03-29 PROCEDURE — 76210000035 HC AMBSU PH I REC 1 TO 1.5 HR: Performed by: SURGERY

## 2022-03-29 PROCEDURE — 76210000046 HC AMBSU PH II REC FIRST 0.5 HR: Performed by: SURGERY

## 2022-03-29 PROCEDURE — 2709999900 HC NON-CHARGEABLE SUPPLY

## 2022-03-29 PROCEDURE — C9290 INJ, BUPIVACAINE LIPOSOME: HCPCS | Performed by: SURGERY

## 2022-03-29 PROCEDURE — 77030040922 HC BLNKT HYPOTHRM STRY -A

## 2022-03-29 PROCEDURE — 77030010507 HC ADH SKN DERMBND J&J -B: Performed by: SURGERY

## 2022-03-29 PROCEDURE — 74011000250 HC RX REV CODE- 250: Performed by: NURSE ANESTHETIST, CERTIFIED REGISTERED

## 2022-03-29 PROCEDURE — 77030011267 HC ELECTRD BLD COVD -A: Performed by: SURGERY

## 2022-03-29 PROCEDURE — 77030041680 HC PNCL ELECSURG SMK EVAC CNMD -B: Performed by: SURGERY

## 2022-03-29 PROCEDURE — 88307 TISSUE EXAM BY PATHOLOGIST: CPT

## 2022-03-29 PROCEDURE — 74011000250 HC RX REV CODE- 250: Performed by: SURGERY

## 2022-03-29 PROCEDURE — 77030002933 HC SUT MCRYL J&J -A: Performed by: SURGERY

## 2022-03-29 PROCEDURE — 76998 US GUIDE INTRAOP: CPT | Performed by: SURGERY

## 2022-03-29 PROCEDURE — 77030031139 HC SUT VCRL2 J&J -A: Performed by: SURGERY

## 2022-03-29 PROCEDURE — 19120 REMOVAL OF BREAST LESION: CPT | Performed by: SURGERY

## 2022-03-29 PROCEDURE — 74011250636 HC RX REV CODE- 250/636: Performed by: NURSE ANESTHETIST, CERTIFIED REGISTERED

## 2022-03-29 PROCEDURE — 74011250636 HC RX REV CODE- 250/636: Performed by: SURGERY

## 2022-03-29 PROCEDURE — 77030040361 HC SLV COMPR DVT MDII -B: Performed by: SURGERY

## 2022-03-29 PROCEDURE — 2709999900 HC NON-CHARGEABLE SUPPLY: Performed by: SURGERY

## 2022-03-29 PROCEDURE — 74011250636 HC RX REV CODE- 250/636: Performed by: ANESTHESIOLOGY

## 2022-03-29 RX ORDER — MIDAZOLAM HYDROCHLORIDE 1 MG/ML
0.5 INJECTION, SOLUTION INTRAMUSCULAR; INTRAVENOUS
Status: CANCELLED | OUTPATIENT
Start: 2022-03-29

## 2022-03-29 RX ORDER — DIPHENHYDRAMINE HYDROCHLORIDE 50 MG/ML
12.5 INJECTION, SOLUTION INTRAMUSCULAR; INTRAVENOUS AS NEEDED
Status: CANCELLED | OUTPATIENT
Start: 2022-03-29 | End: 2022-03-29

## 2022-03-29 RX ORDER — HYDROMORPHONE HYDROCHLORIDE 2 MG/1
2 TABLET ORAL
Qty: 20 TABLET | Refills: 0 | Status: SHIPPED | OUTPATIENT
Start: 2022-03-29 | End: 2022-04-05

## 2022-03-29 RX ORDER — ONDANSETRON 4 MG/1
4 TABLET, ORALLY DISINTEGRATING ORAL
Qty: 5 TABLET | Refills: 1 | Status: SHIPPED | OUTPATIENT
Start: 2022-03-29 | End: 2022-04-12

## 2022-03-29 RX ORDER — SODIUM CHLORIDE 9 MG/ML
25 INJECTION, SOLUTION INTRAVENOUS CONTINUOUS
Status: CANCELLED | OUTPATIENT
Start: 2022-03-29

## 2022-03-29 RX ORDER — ROPIVACAINE HYDROCHLORIDE 5 MG/ML
150 INJECTION, SOLUTION EPIDURAL; INFILTRATION; PERINEURAL AS NEEDED
Status: DISCONTINUED | OUTPATIENT
Start: 2022-03-29 | End: 2022-03-29 | Stop reason: HOSPADM

## 2022-03-29 RX ORDER — FENTANYL CITRATE 50 UG/ML
25 INJECTION, SOLUTION INTRAMUSCULAR; INTRAVENOUS
Status: CANCELLED | OUTPATIENT
Start: 2022-03-29

## 2022-03-29 RX ORDER — LIDOCAINE HYDROCHLORIDE 10 MG/ML
0.1 INJECTION, SOLUTION EPIDURAL; INFILTRATION; INTRACAUDAL; PERINEURAL AS NEEDED
Status: DISCONTINUED | OUTPATIENT
Start: 2022-03-29 | End: 2022-03-29 | Stop reason: HOSPADM

## 2022-03-29 RX ORDER — MIDAZOLAM HYDROCHLORIDE 1 MG/ML
1 INJECTION, SOLUTION INTRAMUSCULAR; INTRAVENOUS AS NEEDED
Status: DISCONTINUED | OUTPATIENT
Start: 2022-03-29 | End: 2022-03-29 | Stop reason: HOSPADM

## 2022-03-29 RX ORDER — MORPHINE SULFATE 2 MG/ML
2 INJECTION, SOLUTION INTRAMUSCULAR; INTRAVENOUS
Status: CANCELLED | OUTPATIENT
Start: 2022-03-29

## 2022-03-29 RX ORDER — ACETAMINOPHEN 325 MG/1
650 TABLET ORAL ONCE
Status: COMPLETED | OUTPATIENT
Start: 2022-03-29 | End: 2022-03-29

## 2022-03-29 RX ORDER — EPHEDRINE SULFATE/0.9% NACL/PF 50 MG/5 ML
5 SYRINGE (ML) INTRAVENOUS AS NEEDED
Status: CANCELLED | OUTPATIENT
Start: 2022-03-29

## 2022-03-29 RX ORDER — SODIUM CHLORIDE, SODIUM LACTATE, POTASSIUM CHLORIDE, CALCIUM CHLORIDE 600; 310; 30; 20 MG/100ML; MG/100ML; MG/100ML; MG/100ML
1000 INJECTION, SOLUTION INTRAVENOUS CONTINUOUS
Status: DISCONTINUED | OUTPATIENT
Start: 2022-03-29 | End: 2022-03-29 | Stop reason: HOSPADM

## 2022-03-29 RX ORDER — ONDANSETRON 2 MG/ML
4 INJECTION INTRAMUSCULAR; INTRAVENOUS AS NEEDED
Status: CANCELLED | OUTPATIENT
Start: 2022-03-29

## 2022-03-29 RX ORDER — FENTANYL CITRATE 50 UG/ML
INJECTION, SOLUTION INTRAMUSCULAR; INTRAVENOUS AS NEEDED
Status: DISCONTINUED | OUTPATIENT
Start: 2022-03-29 | End: 2022-03-29 | Stop reason: HOSPADM

## 2022-03-29 RX ORDER — ONDANSETRON 2 MG/ML
INJECTION INTRAMUSCULAR; INTRAVENOUS AS NEEDED
Status: DISCONTINUED | OUTPATIENT
Start: 2022-03-29 | End: 2022-03-29 | Stop reason: HOSPADM

## 2022-03-29 RX ORDER — SODIUM CHLORIDE, SODIUM LACTATE, POTASSIUM CHLORIDE, CALCIUM CHLORIDE 600; 310; 30; 20 MG/100ML; MG/100ML; MG/100ML; MG/100ML
100 INJECTION, SOLUTION INTRAVENOUS CONTINUOUS
Status: CANCELLED | OUTPATIENT
Start: 2022-03-29

## 2022-03-29 RX ORDER — DEXAMETHASONE SODIUM PHOSPHATE 4 MG/ML
INJECTION, SOLUTION INTRA-ARTICULAR; INTRALESIONAL; INTRAMUSCULAR; INTRAVENOUS; SOFT TISSUE AS NEEDED
Status: DISCONTINUED | OUTPATIENT
Start: 2022-03-29 | End: 2022-03-29 | Stop reason: HOSPADM

## 2022-03-29 RX ORDER — SODIUM CHLORIDE 9 MG/ML
25 INJECTION, SOLUTION INTRAVENOUS CONTINUOUS
Status: DISCONTINUED | OUTPATIENT
Start: 2022-03-29 | End: 2022-03-29 | Stop reason: HOSPADM

## 2022-03-29 RX ORDER — FENTANYL CITRATE 50 UG/ML
50 INJECTION, SOLUTION INTRAMUSCULAR; INTRAVENOUS AS NEEDED
Status: DISCONTINUED | OUTPATIENT
Start: 2022-03-29 | End: 2022-03-29 | Stop reason: HOSPADM

## 2022-03-29 RX ORDER — LIDOCAINE HYDROCHLORIDE 20 MG/ML
INJECTION, SOLUTION EPIDURAL; INFILTRATION; INTRACAUDAL; PERINEURAL AS NEEDED
Status: DISCONTINUED | OUTPATIENT
Start: 2022-03-29 | End: 2022-03-29 | Stop reason: HOSPADM

## 2022-03-29 RX ORDER — PROPOFOL 10 MG/ML
INJECTION, EMULSION INTRAVENOUS AS NEEDED
Status: DISCONTINUED | OUTPATIENT
Start: 2022-03-29 | End: 2022-03-29 | Stop reason: HOSPADM

## 2022-03-29 RX ORDER — HYDROMORPHONE HYDROCHLORIDE 1 MG/ML
0.2 INJECTION, SOLUTION INTRAMUSCULAR; INTRAVENOUS; SUBCUTANEOUS
Status: CANCELLED | OUTPATIENT
Start: 2022-03-29 | End: 2022-03-29

## 2022-03-29 RX ORDER — MIDAZOLAM HYDROCHLORIDE 1 MG/ML
INJECTION, SOLUTION INTRAMUSCULAR; INTRAVENOUS AS NEEDED
Status: DISCONTINUED | OUTPATIENT
Start: 2022-03-29 | End: 2022-03-29 | Stop reason: HOSPADM

## 2022-03-29 RX ADMIN — PROPOFOL 50 MG: 10 INJECTION, EMULSION INTRAVENOUS at 09:32

## 2022-03-29 RX ADMIN — FENTANYL CITRATE 25 MCG: 50 INJECTION, SOLUTION INTRAMUSCULAR; INTRAVENOUS at 09:32

## 2022-03-29 RX ADMIN — FENTANYL CITRATE 25 MCG: 50 INJECTION, SOLUTION INTRAMUSCULAR; INTRAVENOUS at 09:09

## 2022-03-29 RX ADMIN — ONDANSETRON HYDROCHLORIDE 4 MG: 2 INJECTION, SOLUTION INTRAMUSCULAR; INTRAVENOUS at 09:34

## 2022-03-29 RX ADMIN — PROPOFOL 150 MG: 10 INJECTION, EMULSION INTRAVENOUS at 09:09

## 2022-03-29 RX ADMIN — SODIUM CHLORIDE, POTASSIUM CHLORIDE, SODIUM LACTATE AND CALCIUM CHLORIDE 1000 ML: 600; 310; 30; 20 INJECTION, SOLUTION INTRAVENOUS at 08:28

## 2022-03-29 RX ADMIN — ACETAMINOPHEN 650 MG: 325 TABLET ORAL at 08:32

## 2022-03-29 RX ADMIN — LIDOCAINE HYDROCHLORIDE 40 MG: 20 INJECTION, SOLUTION EPIDURAL; INFILTRATION; INTRACAUDAL; PERINEURAL at 09:09

## 2022-03-29 RX ADMIN — MIDAZOLAM 2 MG: 1 INJECTION INTRAMUSCULAR; INTRAVENOUS at 09:04

## 2022-03-29 RX ADMIN — DEXAMETHASONE SODIUM PHOSPHATE 4 MG: 4 INJECTION, SOLUTION INTRAMUSCULAR; INTRAVENOUS at 09:14

## 2022-03-29 NOTE — DISCHARGE INSTRUCTIONS
Discharge Instructions from Dr. Chilel Mis    · I will call you with the pathology results, typically within 1 week from today. · You may shower, but no hot tubs, swimming pools, or baths until your incision is healed. · No heavy lifting with the affected extremity (nothing greater than 5 pounds), and limit its use for the next 4-5 days. · You may use an ice pack for comfort for the next couple of days, but do not place ice directly on the skin. Rather, use a towel or clothing to serve as a barrier between skin and ice to prevent injury. .  · Follow medication instructions carefully. No aspirin, ibuprofen or aleve x 7 days. May take tylenol   · Wear surgical bra for 24 hours, then remove. Wear supportive bra at all times. · You will have bruising and swelling  · Watch for signs of infection as listed below. · Redness  · Swelling  · Drainage from the incision or from your nipple that appears infected  · Fever over 101.5 degrees for consecutive readings, or over 99.5 if you are currently undergoing chemotherapy. · Call our office (number is below) for a follow-up appointment. · If you have any problems, our phone number is [phone]      You had exparel, a long acting local anesthetic or numbing medication, injected into your surgical site during your procedure today. This medication stays in your system for 96 hours, or 4 days to help with post-operative pain control. You have a teal bracelet on your wrist to indicate this. Please do not remove this bracelet for 4 days, or until Saturday so that other health care providers can know you have had this medication.       Anesthesia Discharge Guidelines      After general anesthesia or intravenous sedation, for 24 hours or while taking prescription Narcotics:  · Limit your activities  · Do not drive and operate hazardous machinery  · Do not make important personal or business decisions  · Do  not drink alcoholic beverages  · If you have not urinated within 8 hours after discharge, please contact your surgeon on call. Diet:     Start with ice chips, Jell-O, clear sports drinks (Gatorade) or clear carbonated beverages (7Up, Ginger ale or Sprite). If these liquids do not make you sick slowly progress back to your normal diet. Some good examples to start are foods like rice, pasta, cereal, banana, and toast. Avoid spicy, greasy, and fatty foods as they may cause nausea and vomiting. Make sure you are drinking plenty of fluids to rehydrate yourself. If you happen to experience a sore throat, cold foods may help your throat feel better. Patient Education      Hydromorphone (Dilaudid, Exalgo) - (By mouth)   Why this medicine is used:   Treats moderate to severe pain. This medicine is a narcotic pain reliever. Contact a nurse or doctor right away if you have:  · Extreme weakness, shallow breathing, fast or slow heartbeat  · Severe confusion, lightheadedness, dizziness, fainting  · Sweating or cold, clammy skin  · Anxiety, restlessness, fever, sweating, muscle spasms, twitching, seeing or hearing things that are not there  · Severe constipation, stomach pain, vomiting     Common side effects:  · Mild constipation, nausea, diarrhea  · Sleepiness, tiredness  · Itching, rash         __Patient Education      Ondansetron (Zofran, Zofran ODT, Zuplenz) - (By mouth, Into the mouth)   Why this medicine is used:   Prevents nausea and vomiting.   Contact a nurse or doctor right away if you have:  · Fast, pounding, or uneven heartbeat  · Lightheadedness or fainting  · Trouble breathing     Common side effects:  · Headache, tiredness  · Constipation, diarrhea

## 2022-03-29 NOTE — ANESTHESIA POSTPROCEDURE EVALUATION
Post-Anesthesia Evaluation and Assessment    Patient: Sabiha Mckeon MRN: [id number]  SSN: [id number]    YOB: 1980  Age: 39 y.o. Sex: female      I have evaluated the patient and they are stable and ready for discharge from the PACU. Cardiovascular Function/Vital Signs  Visit Vitals  /82   Pulse 83   Temp 36.7 °C (98 °F)   Resp 26   Wt 83.9 kg (185 lb)   SpO2 100%   BMI 29.86 kg/m²       Patient is status post General anesthesia for Procedure(s):  RIGHT BREAST EXCISIONAL BIOPSY WITH ULTRASOUND. Nausea/Vomiting: None    Postoperative hydration reviewed and adequate. Pain:  Pain Scale 1: Numeric (0 - 10) (03/29/22 0958)  Pain Intensity 1: 0 (03/29/22 0958)   Managed    Neurological Status:   Neuro (WDL): Exceptions to WDL (03/29/22 7855)  Neuro  Neurologic State: Drowsy (03/29/22 0666)   At baseline    Mental Status, Level of Consciousness: Alert and  oriented to person, place, and time    Pulmonary Status:   O2 Device: None (03/29/22 0958)   Adequate oxygenation and airway patent    Complications related to anesthesia: None    Post-anesthesia assessment completed. No concerns    Signed By: Loly Clark MD     March 29, 2022              Procedure(s):  RIGHT BREAST EXCISIONAL BIOPSY WITH ULTRASOUND. general    <BSHSIANPOST>    INITIAL Post-op Vital signs:   Vitals Value Taken Time   /79 03/29/22 1000   Temp 36.7 °C (98 °F) 03/29/22 0958   Pulse 69 03/29/22 1004   Resp 14 03/29/22 1004   SpO2 97 % 03/29/22 1004   Vitals shown include unvalidated device data.

## 2022-03-29 NOTE — OP NOTES
[address] [facility]  OPERATIVE REPORT    Name:  Tisha Malloy  MR#:  [id number]  :  1980  ACCOUNT #:  [de-identified]  DATE OF SERVICE:  2022    PREOPERATIVE DIAGNOSIS:  Right breast fibroepithelial lesion. POSTOPERATIVE DIAGNOSIS:  Right breast fibroepithelial lesion. PROCEDURE PERFORMED:  Right breast excisional biopsy with ultrasound. SURGEON:  Tari Benavides MD    ASSISTANT:  Rosalba Gilliam RN    ANESTHESIA:  General.    COMPLICATIONS:  None. SPECIMENS REMOVED:  Right breast excisional biopsy. IMPLANTS:  None. ESTIMATED BLOOD LOSS:  Minimal.    DRAINS:  None. FINDINGS:  Right mass excised. INDICATION FOR PROCEDURE:  A 60-year-old female with a fibroepithelial lesion, right breast.  She needed excisional biopsy due to risk of upgrade. DESCRIPTION OF PROCEDURE:  The patient was seen in the preop holding area where surgical site was marked by surgeon. Informed consent was obtained. She was taken to the operating room and laid in supine position where LMA anesthesia was induced. Right breast was prepped and draped in the usual fashion and a time-out was performed. Attention was turned to the right breast at 2 o'clock where the lesion was identified using ultrasound guidance. An inframammary fold incision was made with a 15-blade. Bovie cautery was used to tunnel up to the lesion using ultrasound guidance. This was excised completely and marked short superior, long stitch lateral, and sent for permanent pathology. The cavity was irrigated. A mixture of 20 mL of Exparel with 20 mL of 0.25% Marcaine plain was mixed together and injected in the breast tissue and skin. The deeper tissues were closed with interrupted 3-0 Vicryl and the skin with interrupted 3-0 Vicryl and 4-0 subcuticular Monocryl. Skin glue was placed on the incision as well as a dressing and a bra. All sponge and instrument counts were correct.   The patient went to Recovery in stable condition.       Lillard Cranker, MD      [id number]  D:  03/29/2022 9:58  T:  03/29/2022 10:13  JOB #:  [id number]

## 2022-03-29 NOTE — ANESTHESIA PREPROCEDURE EVALUATION
Relevant Problems   No relevant active problems       Anesthetic History   No history of anesthetic complications            Review of Systems / Medical History  Patient summary reviewed, nursing notes reviewed and pertinent labs reviewed    Pulmonary  Within defined limits                 Neuro/Psych   Within defined limits           Cardiovascular  Within defined limits                     GI/Hepatic/Renal  Within defined limits              Endo/Other        Arthritis     Other Findings              Physical Exam    Airway  Mallampati: II  TM Distance: > 6 cm  Neck ROM: normal range of motion   Mouth opening: Normal     Cardiovascular  Regular rate and rhythm,  S1 and S2 normal,  no murmur, click, rub, or gallop             Dental  No notable dental hx       Pulmonary  Breath sounds clear to auscultation               Abdominal  GI exam deferred       Other Findings            Anesthetic Plan    ASA: 2  Anesthesia type: general          Induction: Intravenous  Anesthetic plan and risks discussed with: Patient

## 2022-03-29 NOTE — BRIEF OP NOTE
Brief Postoperative Note    Patient: Jackson Mcdermott  YOB: 1980  MRN: [id number]    Date of Procedure: 3/29/2022     Pre-Op Diagnosis: RIGHT BREAST MASS    Post-Op Diagnosis: Same as preoperative diagnosis. Procedure(s):  RIGHT BREAST EXCISIONAL BIOPSY WITH ULTRASOUND    Surgeon(s):   Alireza Castillo MD    Surgical Assistant: None    Anesthesia: General     Estimated Blood Loss (mL): Minimal    Complications: None    Specimens:   ID Type Source Tests Collected by Time Destination   1 : Right breast excisional biopsy Fresh Breast  Alireza Castillo MD 3/29/2022 0932 Pathology        Implants: * No implants in log *    Drains: * No LDAs found *    Findings: right mass excised     Electronically Signed by Olga Joaquin MD on 3/29/2022 at 9:55 AM  Dictated

## 2022-03-29 NOTE — H&P
History and Physical    HISTORY OF PRESENT ILLNESS  Kathleen Dhillon is a 39 y.o. female. HPI  NEW patient consult referred by  Dr. Sayra Garcia for RIGHT breast fibroepithelial lesion vs phyllodes tumor. Patient is able to feel a RIGHT breast lump. Painful only when pressed. Mammogram and biopsy done. LTR = 19.7%     1/4/22 - RIGHT breast biopsy - fibroepithelial lesion vs phyllodes tumor     Family History:  Maternal aunt, breast cancer, dx in her late 42's, survivor. Maternal grandmother, colon cancer, genetic testing negative  Maternal grandfather, prostate cancer.      Sister has fibroadenomas.         Mammogram/us. [facility], 12/1/21, BIRADS 4  1.4x1. 6x1.0cm oval solid mass to RIGHT breast @6:0 8 cmfn          Past Medical History:   Diagnosis Date    Arthritis      Vitamin D deficiency 1/14/2021      Past Surgical History:   Procedure Laterality Date    HX GYN          c sections    HX PELVIC LAPAROSCOPY         d/t endometriosis    HX JUAN AND BSO          Social History            Socioeconomic History    Marital status:        Spouse name: Not on file    Number of children: Not on file    Years of education: Not on file    Highest education level: Not on file   Occupational History    Not on file   Tobacco Use    Smoking status: Never Smoker    Smokeless tobacco: Never Used   Vaping Use    Vaping Use: Never used   Substance and Sexual Activity    Alcohol use: Yes       Comment: 3-5 a week    Drug use: No    Sexual activity: Yes       Partners: Male   Other Topics Concern    Not on file   Social History Narrative    Not on file      Social Determinants of Health          Financial Resource Strain:     Difficulty of Paying Living Expenses: Not on file   Food Insecurity:     Worried About Running Out of Food in the Last Year: Not on file    Helen of Food in the Last Year: Not on file   Transportation Needs:     Lack of Transportation (Medical):  Not on file    Lack of Transportation (Non-Medical): Not on file   Physical Activity:     Days of Exercise per Week: Not on file    Minutes of Exercise per Session: Not on file   Stress:     Feeling of Stress : Not on file   Social Connections:     Frequency of Communication with Friends and Family: Not on file    Frequency of Social Gatherings with Friends and Family: Not on file    Attends [employer] Services: Not on file    Active Member of [address] [place] [address] or Organizations: Not on file    Attends Club or Organization Meetings: Not on file    Marital Status: Not on file   Intimate Partner Violence:     Fear of Current or Ex-Partner: Not on file    Emotionally Abused: Not on file    Physically Abused: Not on file    Sexually Abused: Not on file   Housing Stability:     Unable to Pay for Housing in the Last Year: Not on file    Number of [place] in the Last Year: Not on file    Unstable Housing in the Last Year: Not on file               OB History         2    Para        Term                AB        Living            SAB        IAB        Ectopic        Molar        Multiple        Live Births               Obstetric Comments   Menarche 15, LMP 2017, # of children 2, age of 4st delivery 32, Hysterectomy/oophorectomy yes/yes, but still has one, Breast bx yes, history of breast feeding yes, BCP no, Hormone therapy no                Current Outpatient Medications:     cholecalciferol (VITAMIN D3) (5000 Units /125 mcg) capsule, Take 5,000 Units by mouth daily. , Disp: , Rfl:         Allergies   Allergen Reactions    Percocet [Oxycodone-Acetaminophen] Nausea and Vomiting and Other (comments)       Sweating/dizziness.         Review of Systems   Musculoskeletal: Positive for joint pain and neck pain. All other systems reviewed and are negative.        Physical Exam  Vitals and nursing note reviewed. Chest:   Breasts: Breasts are symmetrical.      Right: Mass present.  No inverted nipple, nipple discharge, skin change, tenderness, axillary adenopathy or supraclavicular adenopathy.          Lymphadenopathy:      Cervical: No cervical adenopathy. Upper Body:      Right upper body: No supraclavicular, axillary or pectoral adenopathy.       BREAST ULTRASOUND, Preop planning  Indication:preop planning  right Breast 6:00 8 cfn   Technique: The area was scanned using a high-frequency linear-array near-field transducer  Findings:irregular mass with dropout  Impression: Biopsy site visible with ultrasound with clip   Disposition:  Will schedule right breast us guided excisional biopsy      ASSESSMENT and PLAN      ICD-10-CM ICD-9-CM     1. Breast mass, right  N63.10 611.72        40 yo female with right breast fibroepithelial lesion  Discussed this could be fibroadenoma or phyllodes. Recommend right breast excisional biopsy   Discussed procedure and risks. Risks include bleeding, bruising, scar, infection, need for more surgery   She understood and wished to proceed. Will schedule. 30 minutes was spent with patient on counseling and coordination of care.

## 2022-03-30 ENCOUNTER — TELEPHONE (OUTPATIENT)
Dept: SURGERY | Age: 42
End: 2022-03-30

## 2022-03-30 NOTE — TELEPHONE ENCOUNTER
Called patient to check on her after surgery. There was no answer. Left message to call if she has any questions.

## 2022-04-12 ENCOUNTER — OFFICE VISIT (OUTPATIENT)
Dept: SURGERY | Age: 42
End: 2022-04-12
Payer: COMMERCIAL

## 2022-04-12 VITALS
WEIGHT: 185 LBS | SYSTOLIC BLOOD PRESSURE: 131 MMHG | BODY MASS INDEX: 29.73 KG/M2 | DIASTOLIC BLOOD PRESSURE: 97 MMHG | HEIGHT: 66 IN | HEART RATE: 84 BPM

## 2022-04-12 DIAGNOSIS — N63.10 MASS OF RIGHT BREAST, UNSPECIFIED QUADRANT: Primary | ICD-10-CM

## 2022-04-12 PROCEDURE — 99024 POSTOP FOLLOW-UP VISIT: CPT | Performed by: NURSE PRACTITIONER

## 2022-04-12 NOTE — PATIENT INSTRUCTIONS

## 2022-04-12 NOTE — PROGRESS NOTES
HISTORY OF PRESENT ILLNESS  Mirian Deras is a 39 y.o. female. HPI Established patient presents for a post-op visit. Breast history -   Referring - Dr. Pam Cornejo. [facility], 12/1/21, BIRADS 4 - 1.4x1. 6x1.0cm oval solid mass to RIGHT breast @6:0 8 cmfn  1/4/22 - RIGHT breast biopsy - fibroepithelial lesion vs phyllodes tumor  3/29/22 - Right breast, excisional biopsy - Dr. Nolan Whittaker.       Family History -   Maternal aunt, breast cancer, dx in her late 42's, survivor. Maternal grandmother, colon cancer, genetic testing negative  Maternal grandfather, prostate cancer. ROS    Physical Exam  Chest:             Visit Vitals  BP (!) 131/97 (BP 1 Location: Left upper arm, BP Patient Position: Sitting, BP Cuff Size: Adult)   Pulse 84   Ht 5' 6\" (1.676 m)   Wt 185 lb (83.9 kg)   LMP 11/08/2017   BMI 29.86 kg/m²         ASSESSMENT and PLAN    ICD-10-CM ICD-9-CM    1. Mass of right breast, unspecified quadrant  N63.10 611.72        RIGHT breast incision CDI and healing well. Reviewed pathology. Cleared for all activity; encouraged wearing a supportive bra. Continue mammograms and breast care at [facility]. RTC here PRN. She is comfortable with this plan. All questions answered and she stated understanding.

## 2022-04-19 ENCOUNTER — ANESTHESIA EVENT (OUTPATIENT)
Dept: ENDOSCOPY | Age: 42
End: 2022-04-19
Payer: COMMERCIAL

## 2022-04-19 ENCOUNTER — HOSPITAL ENCOUNTER (OUTPATIENT)
Age: 42
Setting detail: OUTPATIENT SURGERY
Discharge: HOME | End: 2022-04-19
Attending: INTERNAL MEDICINE | Admitting: INTERNAL MEDICINE
Payer: COMMERCIAL

## 2022-04-19 ENCOUNTER — ANESTHESIA (OUTPATIENT)
Dept: ENDOSCOPY | Age: 42
End: 2022-04-19
Payer: COMMERCIAL

## 2022-04-19 VITALS
SYSTOLIC BLOOD PRESSURE: 114 MMHG | HEIGHT: 66 IN | HEART RATE: 65 BPM | WEIGHT: 187 LBS | TEMPERATURE: 98.2 F | OXYGEN SATURATION: 100 % | RESPIRATION RATE: 21 BRPM | BODY MASS INDEX: 30.05 KG/M2 | DIASTOLIC BLOOD PRESSURE: 92 MMHG

## 2022-04-19 PROCEDURE — 2709999900 HC NON-CHARGEABLE SUPPLY: Performed by: INTERNAL MEDICINE

## 2022-04-19 PROCEDURE — 74011250636 HC RX REV CODE- 250/636: Performed by: NURSE ANESTHETIST, CERTIFIED REGISTERED

## 2022-04-19 PROCEDURE — 74011000250 HC RX REV CODE- 250: Performed by: NURSE ANESTHETIST, CERTIFIED REGISTERED

## 2022-04-19 PROCEDURE — 76040000019: Performed by: INTERNAL MEDICINE

## 2022-04-19 PROCEDURE — 76060000031 HC ANESTHESIA FIRST 0.5 HR: Performed by: INTERNAL MEDICINE

## 2022-04-19 PROCEDURE — 77030021593 HC FCPS BIOP ENDOSC BSC -A: Performed by: INTERNAL MEDICINE

## 2022-04-19 PROCEDURE — 88305 TISSUE EXAM BY PATHOLOGIST: CPT

## 2022-04-19 RX ORDER — EPINEPHRINE 0.1 MG/ML
1 INJECTION INTRACARDIAC; INTRAVENOUS
Status: DISCONTINUED | OUTPATIENT
Start: 2022-04-19 | End: 2022-04-19 | Stop reason: HOSPADM

## 2022-04-19 RX ORDER — FENTANYL CITRATE 50 UG/ML
25-200 INJECTION, SOLUTION INTRAMUSCULAR; INTRAVENOUS
Status: DISCONTINUED | OUTPATIENT
Start: 2022-04-19 | End: 2022-04-19 | Stop reason: HOSPADM

## 2022-04-19 RX ORDER — PROPOFOL 10 MG/ML
INJECTION, EMULSION INTRAVENOUS AS NEEDED
Status: DISCONTINUED | OUTPATIENT
Start: 2022-04-19 | End: 2022-04-19 | Stop reason: HOSPADM

## 2022-04-19 RX ORDER — CHOLECALCIFEROL (VITAMIN D3) 125 MCG
5000 CAPSULE ORAL DAILY
COMMUNITY

## 2022-04-19 RX ORDER — NALOXONE HYDROCHLORIDE 0.4 MG/ML
0.4 INJECTION, SOLUTION INTRAMUSCULAR; INTRAVENOUS; SUBCUTANEOUS
Status: DISCONTINUED | OUTPATIENT
Start: 2022-04-19 | End: 2022-04-19 | Stop reason: HOSPADM

## 2022-04-19 RX ORDER — SODIUM CHLORIDE 9 MG/ML
INJECTION, SOLUTION INTRAVENOUS
Status: DISCONTINUED | OUTPATIENT
Start: 2022-04-19 | End: 2022-04-19 | Stop reason: HOSPADM

## 2022-04-19 RX ORDER — LIDOCAINE HYDROCHLORIDE 20 MG/ML
INJECTION, SOLUTION EPIDURAL; INFILTRATION; INTRACAUDAL; PERINEURAL AS NEEDED
Status: DISCONTINUED | OUTPATIENT
Start: 2022-04-19 | End: 2022-04-19 | Stop reason: HOSPADM

## 2022-04-19 RX ORDER — DEXTROMETHORPHAN/PSEUDOEPHED 2.5-7.5/.8
1.2 DROPS ORAL
Status: DISCONTINUED | OUTPATIENT
Start: 2022-04-19 | End: 2022-04-19 | Stop reason: HOSPADM

## 2022-04-19 RX ORDER — SODIUM CHLORIDE 0.9 % (FLUSH) 0.9 %
5-40 SYRINGE (ML) INJECTION AS NEEDED
Status: DISCONTINUED | OUTPATIENT
Start: 2022-04-19 | End: 2022-04-19 | Stop reason: HOSPADM

## 2022-04-19 RX ORDER — SUCRALFATE 1 G/1
1 TABLET ORAL 4 TIMES DAILY
Qty: 56 TABLET | Refills: 0 | Status: SHIPPED | OUTPATIENT
Start: 2022-04-19 | End: 2022-05-03

## 2022-04-19 RX ORDER — MIDAZOLAM HYDROCHLORIDE 1 MG/ML
.25-5 INJECTION, SOLUTION INTRAMUSCULAR; INTRAVENOUS
Status: DISCONTINUED | OUTPATIENT
Start: 2022-04-19 | End: 2022-04-19 | Stop reason: HOSPADM

## 2022-04-19 RX ORDER — SODIUM CHLORIDE 0.9 % (FLUSH) 0.9 %
5-40 SYRINGE (ML) INJECTION EVERY 8 HOURS
Status: DISCONTINUED | OUTPATIENT
Start: 2022-04-19 | End: 2022-04-19 | Stop reason: HOSPADM

## 2022-04-19 RX ORDER — PANTOPRAZOLE SODIUM 40 MG/1
40 TABLET, DELAYED RELEASE ORAL DAILY
Qty: 30 TABLET | Refills: 1 | Status: SHIPPED | OUTPATIENT
Start: 2022-04-19 | End: 2022-06-18

## 2022-04-19 RX ORDER — SODIUM CHLORIDE 9 MG/ML
50 INJECTION, SOLUTION INTRAVENOUS CONTINUOUS
Status: DISCONTINUED | OUTPATIENT
Start: 2022-04-19 | End: 2022-04-19 | Stop reason: HOSPADM

## 2022-04-19 RX ORDER — ATROPINE SULFATE 0.1 MG/ML
0.5 INJECTION INTRAVENOUS
Status: DISCONTINUED | OUTPATIENT
Start: 2022-04-19 | End: 2022-04-19 | Stop reason: HOSPADM

## 2022-04-19 RX ORDER — FLUMAZENIL 0.1 MG/ML
0.2 INJECTION INTRAVENOUS
Status: DISCONTINUED | OUTPATIENT
Start: 2022-04-19 | End: 2022-04-19 | Stop reason: HOSPADM

## 2022-04-19 RX ADMIN — SODIUM CHLORIDE: 900 INJECTION, SOLUTION INTRAVENOUS at 14:24

## 2022-04-19 RX ADMIN — LIDOCAINE HYDROCHLORIDE 100 MG: 20 INJECTION, SOLUTION EPIDURAL; INFILTRATION; INTRACAUDAL; PERINEURAL at 14:41

## 2022-04-19 RX ADMIN — PROPOFOL 50 MG: 10 INJECTION, EMULSION INTRAVENOUS at 14:57

## 2022-04-19 RX ADMIN — PROPOFOL 100 MG: 10 INJECTION, EMULSION INTRAVENOUS at 14:41

## 2022-04-19 RX ADMIN — PROPOFOL 50 MG: 10 INJECTION, EMULSION INTRAVENOUS at 14:46

## 2022-04-19 RX ADMIN — PROPOFOL 50 MG: 10 INJECTION, EMULSION INTRAVENOUS at 14:48

## 2022-04-19 RX ADMIN — PROPOFOL 50 MG: 10 INJECTION, EMULSION INTRAVENOUS at 14:44

## 2022-04-19 RX ADMIN — PROPOFOL 50 MG: 10 INJECTION, EMULSION INTRAVENOUS at 14:55

## 2022-04-19 RX ADMIN — PROPOFOL 50 MG: 10 INJECTION, EMULSION INTRAVENOUS at 14:51

## 2022-04-19 RX ADMIN — PROPOFOL 50 MG: 10 INJECTION, EMULSION INTRAVENOUS at 14:53

## 2022-04-19 NOTE — PROCEDURES
[address], [address] [place] [address]      Colonoscopy Operative Report    Brown Chang  [id number]  1980      Procedure Type:   Colonoscopy --screening     Indications:  Family history of colon cancer      Pre-operative Diagnosis: see indication above    Post-operative Diagnosis:  See findings below    :  Rigo Fraser MD    Staff: Endoscopy Maame Poncering: Jaylan Olsen  Endoscopy RN-1: Pati Worthington RN     Referring Provider: Thalia Mendiola MD      Sedation:  MAC anesthesia Propofol      Procedure Details:  After informed consent was obtained with all risks and benefits of procedure explained and preoperative exam completed, the patient was taken to the endoscopy suite and placed in the left lateral decubitus position. Upon sequential sedation as per above, a digital rectal exam was performed demonstrating internal hemorrhoids. The Olympus pediatric videocolonoscope  was inserted in the rectum and carefully advanced to the terminal ileum. The cecum was identified by the ileocecal valve and appendiceal orifice. The quality of preparation was good. The colonoscope was slowly withdrawn with careful evaluation between folds. Retroflexion in the rectum was completed . Findings:   Rectum: normal  Sigmoid: mild diverticulosis  Descending Colon: normal  Transverse Colon: normal  Ascending Colon: normal  Cecum: normal  Terminal Ileum: normal      Specimen Removed:  none    Complications: None. EBL:  None. Impression:    diverticulosis,  Mild in degree, involving the sigmoid  hemorrhoids internal, Small in size    Recommendations:    - Repeat colonoscopy in 5 years. - High fiber diet. Signed By: Rigo Fraser MD     4/19/2022  3:09 PM

## 2022-04-19 NOTE — PROGRESS NOTES

## 2022-04-19 NOTE — H&P
[address], [address] [place] [address]      History and Physical       NAME:  Bari Navarrete   :   1980   MRN:   [id number]             History of Present Illness:  Patient is a 39 y.o. who is seen for history of RUQ pain and family history of colon cancer. PMH:  Past Medical History:   Diagnosis Date    Arthritis     Vitamin D deficiency 2021       PSH:  Past Surgical History:   Procedure Laterality Date    HX BREAST BIOPSY Right 3/29/2022    benign - RIGHT BREAST EXCISIONAL BIOPSY WITH ULTRASOUND performed by Nadiya Bales MD at [address] HX GYN       c sections    HX HEENT      WISDOM TEETH    HX PELVIC LAPAROSCOPY      d/t endometriosis    HX JUAN AND BSO         Allergies: Allergies   Allergen Reactions    Percocet [Oxycodone-Acetaminophen] Nausea and Vomiting and Other (comments)     Sweating/dizziness. Can take tylenol.  Oxycontin [Oxycodone] Nausea and Vomiting       Home Medications:  Prior to Admission Medications   Prescriptions Last Dose Informant Patient Reported? Taking? cetirizine HCl (ZYRTEC PO) 2022 at Unknown time  Yes Yes   Sig: Take  by mouth. cholecalciferol, vitamin D3, (Vitamin D3) 50 mcg (2,000 unit) tab 3/19/2022 at Unknown time  Yes Yes   Sig: Take 5,000 Units by mouth daily.       Facility-Administered Medications: None       Hospital Medications:  Current Facility-Administered Medications   Medication Dose Route Frequency    0.9% sodium chloride infusion  50 mL/hr IntraVENous CONTINUOUS    sodium chloride (NS) flush 5-40 mL  5-40 mL IntraVENous Q8H    sodium chloride (NS) flush 5-40 mL  5-40 mL IntraVENous PRN    midazolam (VERSED) injection 0.25-5 mg  0.25-5 mg IntraVENous Multiple    fentaNYL citrate (PF) injection  mcg   mcg IntraVENous Multiple    naloxone (NARCAN) injection 0.4 mg  0.4 mg IntraVENous Multiple    flumazeniL (ROMAZICON) 0.1 mg/mL injection 0.2 mg  0.2 mg IntraVENous Multiple    simethicone (MYLICON) 54RJ/9.0FS oral drops 80 mg  1.2 mL Oral Multiple    atropine injection 0.5 mg  0.5 mg IntraVENous ONCE PRN    EPINEPHrine (ADRENALIN) 0.1 mg/mL syringe 1 mg  1 mg Endoscopically ONCE PRN       Social History:  Social History     Tobacco Use    Smoking status: Never Smoker    Smokeless tobacco: Never Used   Substance Use Topics    Alcohol use: Yes     Comment: 3-5 a week       Family History:  Family History   Problem Relation Age of Onset    No Known Problems Mother     No Known Problems Father     No Known Problems Sister     No Known Problems Sister     Thyroid Disease Maternal Grandmother     Colon Cancer Maternal Grandmother     Gall Bladder Disease Maternal Grandmother     Kidney Disease Maternal Grandmother         kidney failure    Prostate Cancer Maternal Grandfather     Breast Cancer Maternal Aunt     Heart Disease Neg Hx     Anesth Problems Neg Hx              Review of Systems:      Constitutional: negative fever, negative chills, negative weight loss  Eyes:   negative visual changes  ENT:   negative sore throat, tongue or lip swelling  Respiratory:  negative cough, negative dyspnea  Cards:  negative for chest pain, palpitations, lower extremity edema  GI:   See HPI  :  negative for frequency, dysuria  Integument:  negative for rash and pruritus  Heme:  negative for easy bruising and gum/nose bleeding  Musculoskel: negative for myalgias,  back pain and muscle weakness  Neuro: negative for headaches, dizziness, vertigo  Psych:  negative for feelings of anxiety, depression       Objective:     Patient Vitals for the past 8 hrs:   BP Temp Pulse Resp SpO2 Height Weight   04/19/22 1419 123/78 99.1 °F (37.3 °C) 64 10 99 % 5' 6\" (1.676 m) 84.8 kg (187 lb)     No intake/output data recorded. No intake/output data recorded.     EXAM:     NEURO-a&o   HEENT-wnl   LUNGS-clear    COR-regular rate and rhythym     ABD-soft , no tenderness, no rebound, good bs     EXT-no edema     Data Review     No results for input(s): WBC, HGB, HCT, PLT, HGBEXT, HCTEXT, PLTEXT in the last 72 hours. No results for input(s): NA, K, CL, CO2, BUN, CREA, GLU, PHOS, CA in the last 72 hours. No results for input(s): AP, TBIL, TP, ALB, GLOB, GGT, AML, LPSE in the last 72 hours. No lab exists for component: SGOT, GPT, AMYP, HLPSE  No results for input(s): INR, PTP, APTT, INREXT in the last 72 hours. Assessment:     · RUQ pain  · Family history of colon cancer     Patient Active Problem List   Diagnosis Code    Vitamin D deficiency E55.9     Plan:   · The patient was counseled at length about the risks of genie Covid-19 in the suresh-operative and post-operative states including the recovery window of their procedure. The patient was made aware that genie Covid-19 after a surgical procedure may worsen their prognosis for recovering from the virus and lend to a higher morbidity and or mortality risk. The patient was given the options of postponing their procedure. All of the risks, benefits, and alternatives were discussed. The patient does wish to proceed with the procedure. · Endoscopic procedure with MAC     Signed By: Cathy Screen. Claudene Carmine, MD     4/19/2022  2:36 PM

## 2022-04-19 NOTE — DISCHARGE INSTRUCTIONS
[facility]  [facility]    EGD/COLON DISCHARGE INSTRUCTIONS    Morales Grossman  [id number]  1980    Discomfort:  Sore throat- throat lozenges or warm salt water gargle  redness at IV site- apply warm compress to area; if redness or soreness persist- contact your physician  Gaseous discomfort- walking, belching will help relieve any discomfort  You may not operate a vehicle for 12 hours  You may not engage in an occupation involving machinery or appliances for rest of today  You may not drink alcoholic beverages for at least 12 hours  Avoid making any critical decisions for at least 24 hour  DIET  You may resume your regular diet - however -  remember your colon is empty and a heavy meal will produce gas. Avoid these foods:  vegetables, fried / greasy foods, carbonated drinks    ACTIVITY  You may resume your normal daily activities   Spend the remainder of the day resting -  avoid any strenuous activity. CALL M.D. ANY SIGN OF   Increasing pain, nausea, vomiting  Abdominal distension (swelling)  New increased bleeding (oral or rectal)  Fever (chills)  Pain in chest area  Bloody discharge from nose or mouth  Shortness of breath    Follow-up Instructions:   Call Dr. Nikki King for any questions or problems. Telephone # [phone]    ENDOSCOPY FINDINGS:   Your endoscopy showed gastritis, for which biopsies were taken. We will contact you about the pathology results. Please avoid NSAID's, alcohol, and tobacco products. Prescriptions for pantoprazole and carafate were sent to your pharmacy. Your colonoscopy showed no polyps. Your next colonoscopy will be due in 5 years. Please maintain a high fiber diet. Signed By: Bubba Loyola. Jake Gonzales MD     4/19/2022  3:05 PM         Learning About Coronavirus (COVID-19)  Coronavirus (COVID-19): Overview  What is coronavirus (COVID-19)? The coronavirus disease (COVID-19) is caused by a virus.  It is an illness that was first found in [place], [place], in December 2019. It has since spread worldwide. The virus can cause fever, cough, and trouble breathing. In severe cases, it can cause pneumonia and make it hard to breathe without help. It can cause death. Coronaviruses are a large group of viruses. They cause the common cold. They also cause more serious illnesses like Middle East respiratory syndrome (MERS) and severe acute respiratory syndrome (SARS). COVID-19 is caused by a novel coronavirus. That means it's a new type that has not been seen in people before. This virus spreads person-to-person through droplets from coughing and sneezing. It can also spread when you are close to someone who is infected. And it can spread when you touch something that has the virus on it, such as a doorknob or a tabletop. What can you do to protect yourself from coronavirus (COVID-19)? The best way to protect yourself from getting sick is to:  · Avoid areas where there is an outbreak. · Avoid contact with people who may be infected. · Wash your hands often with soap or alcohol-based hand sanitizers. · Avoid crowds and try to stay at least 6 feet away from other people. · Wash your hands often, especially after you cough or sneeze. Use soap and water, and scrub for at least 20 seconds. If soap and water aren't available, use an alcohol-based hand . · Avoid touching your mouth, nose, and eyes. What can you do to avoid spreading the virus to others? To help avoid spreading the virus to others:  · Cover your mouth with a tissue when you cough or sneeze. Then throw the tissue in the trash. · Use a disinfectant to clean things that you touch often. · Stay home if you are sick or have been exposed to the virus. Don't go to school, work, or public areas. And don't use public transportation. · If you are sick:  ? Leave your home only if you need to get medical care. But call the doctor's office first so they know you're coming. And wear a face mask, if you have one.  ? If you have a face mask, wear it whenever you're around other people. It can help stop the spread of the virus when you cough or sneeze. ? Clean and disinfect your home every day. Use household  and disinfectant wipes or sprays. Take special care to clean things that you grab with your hands. These include doorknobs, remote controls, phones, and handles on your refrigerator and microwave. And don't forget countertops, tabletops, bathrooms, and computer keyboards. When to call for help  Call 911 anytime you think you may need emergency care. For example, call if:  · You have severe trouble breathing. (You can't talk at all.)  · You have constant chest pain or pressure. · You are severely dizzy or lightheaded. · You are confused or can't think clearly. · Your face and lips have a blue color. · You pass out (lose consciousness) or are very hard to wake up. Call your doctor now if you develop symptoms such as:  · Shortness of breath. · Fever. · Cough. If you need to get care, call ahead to the doctor's office for instructions before you go. Make sure you wear a face mask, if you have one, to prevent exposing other people to the virus. Where can you get the latest information? The following health organizations are tracking and studying this virus. Their websites contain the most up-to-date information. Anshul Perez also learn what to do if you think you may have been exposed to the virus. · U.S. Centers for Disease Control and Prevention (CDC): The CDC provides updated news about the disease and travel advice. The website also tells you how to prevent the spread of infection. www.cdc.gov  · World Health Organization [facility]): WHO offers information about the virus outbreaks. WHO also has travel advice. www.who.int  Current as of: April 1, 2020               Content Version: 12.4  © 7764-2869 [employer]orporated.    Care instructions adapted under license by your healthcare professional. If you have questions about a medical condition or this instruction, always ask your healthcare professional. [place] [address] any warranty or liability for your use of this information. [Post Op Day: ___] : Post-Op Day:  #[unfilled] [de-identified] : Doing well status post LAVH bilateral salpingectomy

## 2022-04-19 NOTE — ANESTHESIA PREPROCEDURE EVALUATION
Relevant Problems   No relevant active problems       Anesthetic History   No history of anesthetic complications            Review of Systems / Medical History  Patient summary reviewed, nursing notes reviewed and pertinent labs reviewed    Pulmonary  Within defined limits                 Neuro/Psych   Within defined limits           Cardiovascular  Within defined limits                     GI/Hepatic/Renal  Within defined limits              Endo/Other        Arthritis     Other Findings              Physical Exam    Airway  Mallampati: II  TM Distance: > 6 cm  Neck ROM: normal range of motion   Mouth opening: Normal     Cardiovascular  Regular rate and rhythm,  S1 and S2 normal,  no murmur, click, rub, or gallop             Dental  No notable dental hx       Pulmonary  Breath sounds clear to auscultation               Abdominal  GI exam deferred       Other Findings            Anesthetic Plan    ASA: 2  Anesthesia type: MAC          Induction: Intravenous  Anesthetic plan and risks discussed with: Patient

## 2022-04-19 NOTE — ANESTHESIA POSTPROCEDURE EVALUATION
Procedure(s):  COLONOSCOPY  ESOPHAGOGASTRODUODENOSCOPY (EGD)  ESOPHAGOGASTRODUODENAL (EGD) BIOPSY. MAC    Anesthesia Post Evaluation        Patient participation: complete - patient participated  Level of consciousness: awake  Pain management: adequate  Airway patency: patent  Anesthetic complications: no  Cardiovascular status: hemodynamically stable  Respiratory status: acceptable  Hydration status: acceptable  Comments: The patient is ready for PACU discharge.   Shira Ac DO                   Post anesthesia nausea and vomiting:  controlled      INITIAL Post-op Vital signs:   Vitals Value Taken Time   /93 04/19/22 1515   Temp 36.8 °C (98.2 °F) 04/19/22 1510   Pulse 65 04/19/22 1519   Resp 21 04/19/22 1519   SpO2 100 % 04/19/22 1519

## 2022-04-19 NOTE — PROCEDURES
[address]  [facility], [facility] (EGD) Procedure Note    Marshall Bowers  1980  [id number]      Procedure: Endoscopic Gastroduodenoscopy with biopsy    Indication:  Abdominal pain, RUQ     Pre-operative Diagnosis: see indication above    Post-operative Diagnosis: see findings below    : Saige Montoya MD    Staff: Endoscopy [facility] Hark: Guille Hernandes  Endoscopy RN-1: Gabriel Layton RN     Referring Provider:  Dre Dc MD      Anesthesia/Sedation:  MAC anesthesia Propofol        Procedure Details     After informed consent was obtained for the procedure, with all risks and benefits of procedure explained the patient was taken to the endoscopy suite and placed in the left lateral decubitus position. Following sequential administration of sedation as per above, the endoscope was inserted into the mouth and advanced under direct vision to second portion of the duodenum. A careful inspection was made as the gastroscope was withdrawn, including a retroflexed view of the proximal stomach; findings and interventions are described below. Findings:   Esophagus:normal  Stomach: patchy antral erythema - cold biopsies taken  Duodenum: normal to second portion - cold biopsies taken      Therapies:  As above    Specimens: 1. Duodenum, 2. Gastric         EBL: None      Complications:   None; patient tolerated the procedure well. Impression:    As above    Recommendations:  1. Follow up surgical pathology  2. Avoid NSAID's, alcohol, tobacco products  3. Pantoprazole 40 mg daily for two months  4. Carafate 1 g QID for 14 days  5. Proceed to colonoscopy    Signed By: Saige Engel.  Blair Montoya MD     4/19/2022  3:07 PM

## 2022-09-15 ENCOUNTER — TRANSCRIBE ORDER (OUTPATIENT)
Dept: SCHEDULING | Age: 42
End: 2022-09-15

## 2022-09-15 DIAGNOSIS — M54.59 OTHER LOW BACK PAIN: Primary | ICD-10-CM

## 2022-09-22 ENCOUNTER — HOSPITAL ENCOUNTER (OUTPATIENT)
Dept: MRI IMAGING | Age: 42
Discharge: HOME | End: 2022-09-22
Attending: PAIN MEDICINE
Payer: COMMERCIAL

## 2022-09-22 DIAGNOSIS — M54.59 OTHER LOW BACK PAIN: ICD-10-CM

## 2022-09-22 PROCEDURE — 72148 MRI LUMBAR SPINE W/O DYE: CPT

## (undated) DEVICE — PENCIL SMK EVAC L10FT DIA95MM TBNG NONSTICK W ADPT TO 22MM

## (undated) DEVICE — SPONGE GZ W4XL4IN COT 12 PLY TYP VII WVN C FLD DSGN

## (undated) DEVICE — SOLUTION IRRIG 1000ML 0.9% SOD CHL USP POUR PLAS BTL

## (undated) DEVICE — FORCEPS BX L240CM JAW DIA2.8MM L CAP W/ NDL MIC MESH TOOTH

## (undated) DEVICE — NEEDLE HYPO 22GA L1.5IN BLK POLYPR HUB S STL REG BVL STR

## (undated) DEVICE — SUTURE MCRYL SZ 4-0 L27IN ABSRB UD L19MM PS-2 1/2 CIR PRIM Y426H

## (undated) DEVICE — SUTURE VCRL SZ 2-0 L27IN ABSRB UD L26MM SH 1/2 CIR J417H

## (undated) DEVICE — SYR 10ML LUER LOK 1/5ML GRAD --

## (undated) DEVICE — PACK,BASIC,SIRUS,V: Brand: MEDLINE

## (undated) DEVICE — INSULATED BLADE ELECTRODE: Brand: EDGE

## (undated) DEVICE — BRA SURG POST-OP XL 46IN --

## (undated) DEVICE — PLASTICS CHEST BREAST ASU: Brand: MEDLINE INDUSTRIES, INC.

## (undated) DEVICE — DERMABOND SKIN ADH 0.7ML -- DERMABOND ADVANCED 12/BX

## (undated) DEVICE — PROVE COVER: Brand: UNBRANDED

## (undated) DEVICE — TUBING HYDR IRR --

## (undated) DEVICE — GARMENT,MEDLINE,DVT,INT,CALF,MED, GEN2: Brand: MEDLINE

## (undated) DEVICE — GLOVE SURG SZ 6 L12IN FNGR THK79MIL GRN LTX FREE